# Patient Record
Sex: MALE | Race: BLACK OR AFRICAN AMERICAN | Employment: OTHER | URBAN - METROPOLITAN AREA
[De-identification: names, ages, dates, MRNs, and addresses within clinical notes are randomized per-mention and may not be internally consistent; named-entity substitution may affect disease eponyms.]

---

## 2023-11-22 ENCOUNTER — APPOINTMENT (OUTPATIENT)
Dept: GENERAL RADIOLOGY | Age: 53
End: 2023-11-22
Payer: MEDICARE

## 2023-11-22 ENCOUNTER — HOSPITAL ENCOUNTER (EMERGENCY)
Age: 53
Discharge: HOME | End: 2023-11-22
Attending: EMERGENCY MEDICINE
Payer: MEDICARE

## 2023-11-22 VITALS
OXYGEN SATURATION: 100 % | RESPIRATION RATE: 17 BRPM | BODY MASS INDEX: 25.62 KG/M2 | HEART RATE: 85 BPM | SYSTOLIC BLOOD PRESSURE: 132 MMHG | WEIGHT: 183 LBS | HEIGHT: 71 IN | DIASTOLIC BLOOD PRESSURE: 88 MMHG | TEMPERATURE: 98.6 F

## 2023-11-22 DIAGNOSIS — M25.512 ACUTE PAIN OF LEFT SHOULDER: Primary | ICD-10-CM

## 2023-11-22 PROCEDURE — 6370000000 HC RX 637 (ALT 250 FOR IP)

## 2023-11-22 PROCEDURE — 99283 EMERGENCY DEPT VISIT LOW MDM: CPT

## 2023-11-22 PROCEDURE — 73030 X-RAY EXAM OF SHOULDER: CPT

## 2023-11-22 RX ORDER — NITROGLYCERIN 0.4 MG/1
0.4 TABLET SUBLINGUAL EVERY 5 MIN PRN
COMMUNITY

## 2023-11-22 RX ORDER — GABAPENTIN 300 MG/1
300 CAPSULE ORAL 2 TIMES DAILY
COMMUNITY

## 2023-11-22 RX ORDER — PRAVASTATIN SODIUM 20 MG
20 TABLET ORAL DAILY
COMMUNITY

## 2023-11-22 RX ORDER — SEVELAMER CARBONATE 800 MG/1
1 TABLET, FILM COATED ORAL
COMMUNITY

## 2023-11-22 RX ORDER — CARVEDILOL 25 MG/1
25 TABLET ORAL 2 TIMES DAILY WITH MEALS
COMMUNITY

## 2023-11-22 RX ORDER — ALLOPURINOL 100 MG/1
100 TABLET ORAL DAILY
COMMUNITY

## 2023-11-22 RX ORDER — ZOLPIDEM TARTRATE 10 MG/1
TABLET ORAL NIGHTLY PRN
COMMUNITY

## 2023-11-22 RX ORDER — FERRIC CITRATE 210 MG/1
TABLET, COATED ORAL
COMMUNITY

## 2023-11-22 RX ORDER — PREDNISONE 50 MG/1
TABLET ORAL
Qty: 6 TABLET | Refills: 0 | Status: SHIPPED | OUTPATIENT
Start: 2023-11-22

## 2023-11-22 RX ORDER — ESCITALOPRAM OXALATE 20 MG/1
20 TABLET ORAL DAILY
COMMUNITY

## 2023-11-22 RX ORDER — KETOROLAC TROMETHAMINE 30 MG/ML
30 INJECTION, SOLUTION INTRAMUSCULAR; INTRAVENOUS
Status: DISCONTINUED | OUTPATIENT
Start: 2023-11-22 | End: 2023-11-22

## 2023-11-22 RX ORDER — LORAZEPAM 0.5 MG/1
0.5 TABLET ORAL EVERY 6 HOURS PRN
COMMUNITY

## 2023-11-22 RX ORDER — PROMETHAZINE HYDROCHLORIDE 25 MG/1
25 TABLET ORAL EVERY 6 HOURS PRN
COMMUNITY

## 2023-11-22 RX ORDER — AMLODIPINE BESYLATE 10 MG/1
10 TABLET ORAL DAILY
COMMUNITY

## 2023-11-22 RX ORDER — LIDOCAINE 4 G/G
1 PATCH TOPICAL
Status: DISCONTINUED | OUTPATIENT
Start: 2023-11-22 | End: 2023-11-22 | Stop reason: HOSPADM

## 2023-11-22 ASSESSMENT — PAIN SCALES - GENERAL
PAINLEVEL_OUTOF10: 10
PAINLEVEL_OUTOF10: 3

## 2023-11-22 ASSESSMENT — PAIN DESCRIPTION - ORIENTATION: ORIENTATION: LEFT

## 2023-11-22 ASSESSMENT — PAIN DESCRIPTION - LOCATION: LOCATION: SHOULDER

## 2023-11-22 ASSESSMENT — PAIN - FUNCTIONAL ASSESSMENT
PAIN_FUNCTIONAL_ASSESSMENT: 0-10
PAIN_FUNCTIONAL_ASSESSMENT: 0-10

## 2023-11-22 NOTE — DISCHARGE INSTRUCTIONS
Your your exam and x-rays are reassuring today. Take the prednisone steroids at home for pain and symptom relief. Do not take other NSAIDs including ibuprofen or Aleve or aspirin while you are taking the prednisone. I have also arranged for you to have outpatient follow-up with an orthopedic office. Scheduling will contact you tomorrow or later today to help you schedule an appointment. Return if you have any new or worsening symptoms.   Your prescription was sent to [facility] in [facility].

## 2023-11-22 NOTE — ED TRIAGE NOTES
Pt ambulatory to triage. Pt reports left shoulder pain for months. Pt states he thinks it is his rotator cuff. Pt states he use to be active in martial arts. Pt states he took tylenol and ibuprofen last night.

## 2023-11-22 NOTE — ED PROVIDER NOTES
Emergency Department Provider Note       PCP: No primary care provider on file. Age: 48 y.o. Sex: male     DISPOSITION Decision To Discharge 11/22/2023 02:36:16 PM       ICD-10-CM    1. Acute pain of left shoulder  M25.512 [address]          Medical Decision Making     Complexity of Problems Addressed:  Complexity of Problem: 1 acute, uncomplicated illness or injury. Data Reviewed and Analyzed:  I independently ordered and reviewed each unique test.         I interpreted the X-rays. No acute    Discussion of management or test interpretation. Presented with concerns of left shoulder pain for the last 3 days. Pain exacerbated with positional changes and movement as noted in HPI. Left shoulder x-ray imaging negative for acute fracture or dislocation. Concern for biceps tendinitis versus underlying soft tissue injury or rotator cuff involvement given reduced range of motion of left shoulder. I provided outpatient referral to orthopedics for further evaluation and management. Advised conservative symptomatic management for the time being until he has follow-up with Ortho. Risk of Complications and/or Morbidity of Patient Management:  Prescription drug management performed and Shared medical decision making was utilized in creating the patients health plan today. History      80-year-old male presenting to emergency department with complaint of left shoulder pain for the past 3 days. Reports he was lying in bed a few nights ago he felt some pain in his left shoulder and the pain is exacerbated with positional changes and movement. Also complaining of some pain in his left tricep/upper arm area. He is dialysis 3 times per week and has tenderness right above his dialysis site. Denies redness or swelling or fevers. The history is provided by the patient. Physical Exam     Vitals signs and nursing note reviewed.    Vitals:

## 2024-06-22 ENCOUNTER — APPOINTMENT (OUTPATIENT)
Dept: GENERAL RADIOLOGY | Age: 54
DRG: 640 | End: 2024-06-22
Payer: MEDICARE

## 2024-06-22 ENCOUNTER — APPOINTMENT (OUTPATIENT)
Dept: GENERAL RADIOLOGY | Age: 54
DRG: 640 | End: 2024-06-22
Attending: INTERNAL MEDICINE
Payer: MEDICARE

## 2024-06-22 ENCOUNTER — HOSPITAL ENCOUNTER (INPATIENT)
Age: 54
LOS: 3 days | Discharge: HOME | DRG: 640 | End: 2024-06-25
Attending: INTERNAL MEDICINE | Admitting: INTERNAL MEDICINE
Payer: MEDICARE

## 2024-06-22 ENCOUNTER — HOSPITAL ENCOUNTER (EMERGENCY)
Age: 54
Discharge: ANOTHER HOSPITAL | DRG: 640 | End: 2024-06-22
Attending: EMERGENCY MEDICINE
Payer: MEDICARE

## 2024-06-22 ENCOUNTER — APPOINTMENT (OUTPATIENT)
Dept: NON INVASIVE DIAGNOSTICS | Age: 54
DRG: 640 | End: 2024-06-22
Attending: INTERNAL MEDICINE
Payer: MEDICARE

## 2024-06-22 VITALS
RESPIRATION RATE: 14 BRPM | TEMPERATURE: 97.6 F | DIASTOLIC BLOOD PRESSURE: 106 MMHG | SYSTOLIC BLOOD PRESSURE: 231 MMHG | WEIGHT: 185.2 LBS | OXYGEN SATURATION: 92 % | BODY MASS INDEX: 25.93 KG/M2 | HEIGHT: 71 IN | HEART RATE: 93 BPM

## 2024-06-22 DIAGNOSIS — E87.70 HYPERVOLEMIA, UNSPECIFIED HYPERVOLEMIA TYPE: ICD-10-CM

## 2024-06-22 DIAGNOSIS — I16.1 HYPERTENSIVE EMERGENCY: Primary | ICD-10-CM

## 2024-06-22 DIAGNOSIS — Z99.2 END STAGE RENAL DISEASE ON DIALYSIS (HCC): ICD-10-CM

## 2024-06-22 DIAGNOSIS — I50.9 CONGESTIVE HEART FAILURE, UNSPECIFIED HF CHRONICITY, UNSPECIFIED HEART FAILURE TYPE (HCC): Primary | ICD-10-CM

## 2024-06-22 DIAGNOSIS — N18.6 END STAGE RENAL DISEASE ON DIALYSIS (HCC): ICD-10-CM

## 2024-06-22 PROBLEM — Z45.2 EXHAUSTED VASCULAR ACCESS: Status: ACTIVE | Noted: 2024-06-22

## 2024-06-22 LAB
ALBUMIN SERPL-MCNC: 3.5 G/DL (ref 3.5–5)
ALBUMIN/GLOB SERPL: 1.1 (ref 0.4–1.6)
ALP SERPL-CCNC: 118 U/L (ref 45–117)
ALT SERPL-CCNC: <5 U/L (ref 13–61)
ANION GAP SERPL CALC-SCNC: 25 MMOL/L (ref 2–11)
AST SERPL-CCNC: 21 U/L (ref 15–37)
BASOPHILS # BLD: 0.2 K/UL (ref 0–0.2)
BASOPHILS NFR BLD: 1 % (ref 0–2)
BILIRUB SERPL-MCNC: 0.4 MG/DL (ref 0.2–1.1)
BUN SERPL-MCNC: 23 MG/DL (ref 6–23)
CALCIUM SERPL-MCNC: 8.9 MG/DL (ref 8.3–10.4)
CHLORIDE SERPL-SCNC: 103 MMOL/L (ref 98–107)
CO2 SERPL-SCNC: 18 MMOL/L (ref 21–32)
CREAT SERPL-MCNC: 9.86 MG/DL (ref 0.8–1.5)
DIFFERENTIAL METHOD BLD: ABNORMAL
EKG ATRIAL RATE: 121 BPM
EKG DIAGNOSIS: NORMAL
EKG P AXIS: 58 DEGREES
EKG P-R INTERVAL: 152 MS
EKG Q-T INTERVAL: 368 MS
EKG QRS DURATION: 84 MS
EKG QTC CALCULATION (BAZETT): 522 MS
EKG R AXIS: 26 DEGREES
EKG T AXIS: 58 DEGREES
EKG VENTRICULAR RATE: 121 BPM
EOSINOPHIL # BLD: 1 K/UL (ref 0–0.8)
EOSINOPHIL NFR BLD: 6 % (ref 0.5–7.8)
ERYTHROCYTE [DISTWIDTH] IN BLOOD BY AUTOMATED COUNT: 16 % (ref 11.9–14.6)
GLOBULIN SER CALC-MCNC: 3.3 G/DL (ref 2.8–4.5)
GLUCOSE SERPL-MCNC: 137 MG/DL (ref 65–100)
HBV SURFACE AG SER QL: NONREACTIVE
HCT VFR BLD AUTO: 28.6 % (ref 41.1–50.3)
HGB BLD-MCNC: 8.7 G/DL (ref 13.6–17.2)
IMM GRANULOCYTES # BLD AUTO: 0 K/UL (ref 0–0.5)
IMM GRANULOCYTES NFR BLD AUTO: 0 % (ref 0–5)
LACTATE SERPL-SCNC: 3.8 MMOL/L (ref 0.5–2)
LYMPHOCYTES # BLD: 6.9 K/UL (ref 0.5–4.6)
LYMPHOCYTES NFR BLD: 43 % (ref 13–44)
MCH RBC QN AUTO: 30.6 PG (ref 26.1–32.9)
MCHC RBC AUTO-ENTMCNC: 30.4 G/DL (ref 31.4–35)
MCV RBC AUTO: 100.7 FL (ref 82–102)
MONOCYTES # BLD: 1.1 K/UL (ref 0.1–1.3)
MONOCYTES NFR BLD: 7 % (ref 4–12)
NEUTS SEG # BLD: 6.9 K/UL (ref 1.7–8.2)
NEUTS SEG NFR BLD: 43 % (ref 43–78)
NRBC # BLD: 0 K/UL (ref 0–0.2)
PLATELET # BLD AUTO: 187 K/UL (ref 150–450)
PLATELET COMMENT: ADEQUATE
PMV BLD AUTO: 11.5 FL (ref 9.4–12.3)
POTASSIUM SERPL-SCNC: 4.7 MMOL/L (ref 3.5–5.1)
PROCALCITONIN SERPL-MCNC: 0.75 NG/ML (ref 0–0.1)
PROT SERPL-MCNC: 6.8 G/DL (ref 6.4–8.2)
RBC # BLD AUTO: 2.84 M/UL (ref 4.23–5.6)
RBC MORPH BLD: ABNORMAL
SODIUM SERPL-SCNC: 146 MMOL/L (ref 133–143)
TROPONIN T SERPL HS-MCNC: 75 NG/L (ref 0–22)
TROPONIN T SERPL HS-MCNC: 76.3 NG/L (ref 0–22)
TROPONIN T SERPL HS-MCNC: 83 NG/L (ref 0–22)
WBC # BLD AUTO: 16.1 K/UL (ref 4.3–11.1)

## 2024-06-22 PROCEDURE — 2700000000 HC OXYGEN THERAPY PER DAY

## 2024-06-22 PROCEDURE — 90935 HEMODIALYSIS ONE EVALUATION: CPT

## 2024-06-22 PROCEDURE — 36556 INSERT NON-TUNNEL CV CATH: CPT | Performed by: INTERNAL MEDICINE

## 2024-06-22 PROCEDURE — 87340 HEPATITIS B SURFACE AG IA: CPT

## 2024-06-22 PROCEDURE — 6360000002 HC RX W HCPCS: Performed by: INTERNAL MEDICINE

## 2024-06-22 PROCEDURE — 99223 1ST HOSP IP/OBS HIGH 75: CPT | Performed by: INTERNAL MEDICINE

## 2024-06-22 PROCEDURE — 30233N1 TRANSFUSION OF NONAUTOLOGOUS RED BLOOD CELLS INTO PERIPHERAL VEIN, PERCUTANEOUS APPROACH: ICD-10-PCS | Performed by: INTERNAL MEDICINE

## 2024-06-22 PROCEDURE — 71045 X-RAY EXAM CHEST 1 VIEW: CPT

## 2024-06-22 PROCEDURE — 84484 ASSAY OF TROPONIN QUANT: CPT

## 2024-06-22 PROCEDURE — 6370000000 HC RX 637 (ALT 250 FOR IP): Performed by: INTERNAL MEDICINE

## 2024-06-22 PROCEDURE — 93005 ELECTROCARDIOGRAM TRACING: CPT | Performed by: EMERGENCY MEDICINE

## 2024-06-22 PROCEDURE — 80053 COMPREHEN METABOLIC PANEL: CPT

## 2024-06-22 PROCEDURE — 6370000000 HC RX 637 (ALT 250 FOR IP)

## 2024-06-22 PROCEDURE — 02HV33Z INSERTION OF INFUSION DEVICE INTO SUPERIOR VENA CAVA, PERCUTANEOUS APPROACH: ICD-10-PCS | Performed by: INTERNAL MEDICINE

## 2024-06-22 PROCEDURE — 36415 COLL VENOUS BLD VENIPUNCTURE: CPT

## 2024-06-22 PROCEDURE — 93010 ELECTROCARDIOGRAM REPORT: CPT | Performed by: INTERNAL MEDICINE

## 2024-06-22 PROCEDURE — 85025 COMPLETE CBC W/AUTO DIFF WBC: CPT

## 2024-06-22 PROCEDURE — 2500000003 HC RX 250 WO HCPCS: Performed by: INTERNAL MEDICINE

## 2024-06-22 PROCEDURE — 84145 PROCALCITONIN (PCT): CPT

## 2024-06-22 PROCEDURE — 83605 ASSAY OF LACTIC ACID: CPT

## 2024-06-22 PROCEDURE — 6360000002 HC RX W HCPCS

## 2024-06-22 PROCEDURE — 36556 INSERT NON-TUNNEL CV CATH: CPT

## 2024-06-22 PROCEDURE — 5A1D80Z PERFORMANCE OF URINARY FILTRATION, PROLONGED INTERMITTENT, 6-18 HOURS PER DAY: ICD-10-PCS | Performed by: INTERNAL MEDICINE

## 2024-06-22 PROCEDURE — 2100000000 HC CCU R&B

## 2024-06-22 PROCEDURE — 2580000003 HC RX 258: Performed by: INTERNAL MEDICINE

## 2024-06-22 PROCEDURE — 87040 BLOOD CULTURE FOR BACTERIA: CPT

## 2024-06-22 RX ORDER — SODIUM CHLORIDE 0.9 % (FLUSH) 0.9 %
5-40 SYRINGE (ML) INJECTION PRN
Status: DISCONTINUED | OUTPATIENT
Start: 2024-06-22 | End: 2024-06-25 | Stop reason: HOSPADM

## 2024-06-22 RX ORDER — ONDANSETRON 2 MG/ML
4 INJECTION INTRAMUSCULAR; INTRAVENOUS EVERY 6 HOURS PRN
Status: DISCONTINUED | OUTPATIENT
Start: 2024-06-22 | End: 2024-06-25 | Stop reason: HOSPADM

## 2024-06-22 RX ORDER — MIDAZOLAM HYDROCHLORIDE 2 MG/2ML
2 INJECTION, SOLUTION INTRAMUSCULAR; INTRAVENOUS ONCE
Status: DISCONTINUED | OUTPATIENT
Start: 2024-06-22 | End: 2024-06-25 | Stop reason: HOSPADM

## 2024-06-22 RX ORDER — NITROGLYCERIN 20 MG/100ML
INJECTION INTRAVENOUS
Status: COMPLETED
Start: 2024-06-22 | End: 2024-06-22

## 2024-06-22 RX ORDER — POLYETHYLENE GLYCOL 3350 17 G/17G
17 POWDER, FOR SOLUTION ORAL DAILY PRN
Status: DISCONTINUED | OUTPATIENT
Start: 2024-06-22 | End: 2024-06-25 | Stop reason: HOSPADM

## 2024-06-22 RX ORDER — ATORVASTATIN CALCIUM 10 MG/1
10 TABLET, FILM COATED ORAL DAILY
Status: DISCONTINUED | OUTPATIENT
Start: 2024-06-22 | End: 2024-06-25 | Stop reason: HOSPADM

## 2024-06-22 RX ORDER — SODIUM CHLORIDE 9 MG/ML
INJECTION, SOLUTION INTRAVENOUS PRN
Status: DISCONTINUED | OUTPATIENT
Start: 2024-06-22 | End: 2024-06-25 | Stop reason: HOSPADM

## 2024-06-22 RX ORDER — ONDANSETRON 4 MG/1
4 TABLET, ORALLY DISINTEGRATING ORAL EVERY 8 HOURS PRN
Status: DISCONTINUED | OUTPATIENT
Start: 2024-06-22 | End: 2024-06-25 | Stop reason: HOSPADM

## 2024-06-22 RX ORDER — ZOLPIDEM TARTRATE 5 MG/1
5 TABLET ORAL NIGHTLY PRN
Status: DISCONTINUED | OUTPATIENT
Start: 2024-06-22 | End: 2024-06-25 | Stop reason: HOSPADM

## 2024-06-22 RX ORDER — SODIUM CHLORIDE 0.9 % (FLUSH) 0.9 %
5-40 SYRINGE (ML) INJECTION EVERY 12 HOURS SCHEDULED
Status: DISCONTINUED | OUTPATIENT
Start: 2024-06-22 | End: 2024-06-25 | Stop reason: HOSPADM

## 2024-06-22 RX ORDER — AMLODIPINE BESYLATE 10 MG/1
10 TABLET ORAL DAILY
Status: DISCONTINUED | OUTPATIENT
Start: 2024-06-22 | End: 2024-06-25 | Stop reason: HOSPADM

## 2024-06-22 RX ORDER — NITROGLYCERIN 0.4 MG/1
0.4 TABLET SUBLINGUAL EVERY 5 MIN PRN
Status: DISCONTINUED | OUTPATIENT
Start: 2024-06-22 | End: 2024-06-25 | Stop reason: HOSPADM

## 2024-06-22 RX ORDER — CARVEDILOL 12.5 MG/1
25 TABLET ORAL 2 TIMES DAILY WITH MEALS
Status: DISCONTINUED | OUTPATIENT
Start: 2024-06-22 | End: 2024-06-25 | Stop reason: HOSPADM

## 2024-06-22 RX ORDER — CLONIDINE HYDROCHLORIDE 0.1 MG/1
0.1 TABLET ORAL PRN
Status: ON HOLD | COMMUNITY
Start: 2024-06-20 | End: 2024-06-25 | Stop reason: HOSPADM

## 2024-06-22 RX ORDER — OXYCODONE HYDROCHLORIDE 5 MG/1
5 TABLET ORAL EVERY 4 HOURS PRN
COMMUNITY
Start: 2024-06-18

## 2024-06-22 RX ORDER — ESCITALOPRAM OXALATE 10 MG/1
20 TABLET ORAL DAILY
Status: DISCONTINUED | OUTPATIENT
Start: 2024-06-22 | End: 2024-06-25 | Stop reason: HOSPADM

## 2024-06-22 RX ORDER — ATORVASTATIN CALCIUM 10 MG/1
10 TABLET, FILM COATED ORAL DAILY
COMMUNITY
Start: 2024-05-27

## 2024-06-22 RX ORDER — ACETAMINOPHEN 325 MG/1
650 TABLET ORAL EVERY 6 HOURS PRN
Status: DISCONTINUED | OUTPATIENT
Start: 2024-06-22 | End: 2024-06-25 | Stop reason: HOSPADM

## 2024-06-22 RX ORDER — LACTULOSE 10 G/15ML
20 SOLUTION ORAL 2 TIMES DAILY PRN
Status: DISCONTINUED | OUTPATIENT
Start: 2024-06-22 | End: 2024-06-25 | Stop reason: HOSPADM

## 2024-06-22 RX ORDER — ASPIRIN 81 MG/1
TABLET, CHEWABLE ORAL
Status: COMPLETED
Start: 2024-06-22 | End: 2024-06-22

## 2024-06-22 RX ORDER — ASPIRIN 81 MG/1
324 TABLET, CHEWABLE ORAL ONCE
Status: COMPLETED | OUTPATIENT
Start: 2024-06-22 | End: 2024-06-22

## 2024-06-22 RX ORDER — SEVELAMER CARBONATE 800 MG/1
4000 TABLET, FILM COATED ORAL DAILY
Status: DISCONTINUED | OUTPATIENT
Start: 2024-06-22 | End: 2024-06-22

## 2024-06-22 RX ORDER — ACETAMINOPHEN 650 MG/1
650 SUPPOSITORY RECTAL EVERY 6 HOURS PRN
Status: DISCONTINUED | OUTPATIENT
Start: 2024-06-22 | End: 2024-06-25 | Stop reason: HOSPADM

## 2024-06-22 RX ORDER — ALLOPURINOL 100 MG/1
100 TABLET ORAL DAILY
Status: DISCONTINUED | OUTPATIENT
Start: 2024-06-22 | End: 2024-06-25 | Stop reason: HOSPADM

## 2024-06-22 RX ORDER — DEXMEDETOMIDINE HYDROCHLORIDE 4 UG/ML
.1-1.5 INJECTION, SOLUTION INTRAVENOUS CONTINUOUS
Status: DISCONTINUED | OUTPATIENT
Start: 2024-06-22 | End: 2024-06-23 | Stop reason: ALTCHOICE

## 2024-06-22 RX ORDER — LACTULOSE 10 G/15ML
20 SOLUTION ORAL 2 TIMES DAILY PRN
COMMUNITY
Start: 2024-06-11

## 2024-06-22 RX ORDER — SEVELAMER CARBONATE 800 MG/1
4000 TABLET, FILM COATED ORAL
Status: DISCONTINUED | OUTPATIENT
Start: 2024-06-22 | End: 2024-06-25 | Stop reason: HOSPADM

## 2024-06-22 RX ORDER — OXYCODONE HYDROCHLORIDE 5 MG/1
5 TABLET ORAL EVERY 4 HOURS PRN
Status: DISCONTINUED | OUTPATIENT
Start: 2024-06-22 | End: 2024-06-25 | Stop reason: HOSPADM

## 2024-06-22 RX ORDER — MIDAZOLAM HYDROCHLORIDE 1 MG/ML
INJECTION INTRAMUSCULAR; INTRAVENOUS
Status: COMPLETED
Start: 2024-06-22 | End: 2024-06-22

## 2024-06-22 RX ORDER — NITROGLYCERIN 20 MG/100ML
5-200 INJECTION INTRAVENOUS CONTINUOUS
Status: DISCONTINUED | OUTPATIENT
Start: 2024-06-22 | End: 2024-06-22 | Stop reason: HOSPADM

## 2024-06-22 RX ORDER — CLINDAMYCIN PHOSPHATE 600 MG/50ML
600 INJECTION, SOLUTION INTRAVENOUS EVERY 8 HOURS
Status: DISCONTINUED | OUTPATIENT
Start: 2024-06-22 | End: 2024-06-22

## 2024-06-22 RX ADMIN — SODIUM CHLORIDE 5 MG/HR: 9 INJECTION, SOLUTION INTRAVENOUS at 10:27

## 2024-06-22 RX ADMIN — SODIUM CHLORIDE 1 MG: 9 INJECTION INTRAMUSCULAR; INTRAVENOUS; SUBCUTANEOUS at 13:44

## 2024-06-22 RX ADMIN — ESCITALOPRAM OXALATE 20 MG: 10 TABLET ORAL at 12:33

## 2024-06-22 RX ADMIN — SODIUM CHLORIDE 5 MG/HR: 9 INJECTION, SOLUTION INTRAVENOUS at 17:01

## 2024-06-22 RX ADMIN — NITROGLYCERIN 20 MCG/MIN: 20 INJECTION INTRAVENOUS at 09:09

## 2024-06-22 RX ADMIN — ATORVASTATIN CALCIUM 10 MG: 20 TABLET, FILM COATED ORAL at 12:32

## 2024-06-22 RX ADMIN — SEVELAMER CARBONATE 4000 MG: 800 TABLET, FILM COATED ORAL at 16:41

## 2024-06-22 RX ADMIN — CARVEDILOL 25 MG: 25 TABLET, FILM COATED ORAL at 16:38

## 2024-06-22 RX ADMIN — MIDAZOLAM 2 MG: 1 INJECTION INTRAMUSCULAR; INTRAVENOUS at 10:18

## 2024-06-22 RX ADMIN — ZOLPIDEM TARTRATE 5 MG: 5 TABLET, COATED ORAL at 22:39

## 2024-06-22 RX ADMIN — ASPIRIN 324 MG: 81 TABLET, CHEWABLE ORAL at 09:12

## 2024-06-22 RX ADMIN — CARVEDILOL 25 MG: 25 TABLET, FILM COATED ORAL at 12:33

## 2024-06-22 RX ADMIN — CEFEPIME 2000 MG: 2 INJECTION, POWDER, FOR SOLUTION INTRAVENOUS at 17:25

## 2024-06-22 RX ADMIN — DEXMEDETOMIDINE 0.2 MCG/KG/HR: 100 INJECTION, SOLUTION INTRAVENOUS at 20:19

## 2024-06-22 RX ADMIN — AMLODIPINE BESYLATE 10 MG: 10 TABLET ORAL at 12:33

## 2024-06-22 RX ADMIN — ALLOPURINOL 100 MG: 100 TABLET ORAL at 12:32

## 2024-06-22 RX ADMIN — SODIUM CHLORIDE, PRESERVATIVE FREE 10 ML: 5 INJECTION INTRAVENOUS at 10:26

## 2024-06-22 RX ADMIN — ACETAMINOPHEN 650 MG: 325 TABLET ORAL at 17:43

## 2024-06-22 RX ADMIN — SODIUM CHLORIDE, PRESERVATIVE FREE 10 ML: 5 INJECTION INTRAVENOUS at 20:20

## 2024-06-22 ASSESSMENT — ENCOUNTER SYMPTOMS
ABDOMINAL PAIN: 0
SHORTNESS OF BREATH: 1
VOMITING: 0
COUGH: 0
NAUSEA: 0

## 2024-06-22 ASSESSMENT — PAIN DESCRIPTION - LOCATION: LOCATION: HEAD

## 2024-06-22 ASSESSMENT — PAIN SCALES - GENERAL
PAINLEVEL_OUTOF10: 10
PAINLEVEL_OUTOF10: 0
PAINLEVEL_OUTOF10: 0
PAINLEVEL_OUTOF10: 3

## 2024-06-22 ASSESSMENT — PAIN - FUNCTIONAL ASSESSMENT: PAIN_FUNCTIONAL_ASSESSMENT: 0-10

## 2024-06-22 ASSESSMENT — LIFESTYLE VARIABLES
HOW OFTEN DO YOU HAVE A DRINK CONTAINING ALCOHOL: NEVER
HOW MANY STANDARD DRINKS CONTAINING ALCOHOL DO YOU HAVE ON A TYPICAL DAY: PATIENT DOES NOT DRINK

## 2024-06-22 NOTE — PROGRESS NOTES
VANCO DAILY FOLLOW UP RENAL INSUFFICIENCY PATIENT   [facility]   Pharmacy Pharmacokinetic Monitoring Service - Vancomycin    Consulting Provider: MD Shaniqua   Indication: Sepsis  Target Concentration: Pre-dialysis concentration 15-20 mg/L  Day of Therapy: 1  Additional Antimicrobials: cefepime, clindamycin    Pertinent Laboratory Values:   Wt Readings from Last 1 Encounters:   06/22/24 85.7 kg (189 lb)     Temp Readings from Last 1 Encounters:   06/22/24 97.6 °F (36.4 °C) (Oral)     Recent Labs     06/22/24  0902 06/22/24  1052   BUN 23  --    CREATININE 9.86*  --    WBC 16.1*  --    PROCAL  --  0.75*   LACTA  --  3.8*       No results found for: \"VANCOTROUGH\", \"VANCORANDOM\"    MRSA Nasal Swab: N/A. Non-respiratory infection    Assessment:  Date:  Dose/Freq Admin Times Level/Time:   6/22  HD 2000 mg x 1 (16)    6/23   Rd @ 0400                       Plan:  Concentration-guided dosing due to intermittent hemodialysis  Start vancomycin with 2000 mg x 1  Vancomycin concentrations will be ordered as clinically appropriate  Pharmacy will continue to monitor patient and adjust therapy as indicated    Thank you for the consult,  Jay Forrest, PharmD, BCOP  Clinical Pharmacist  Contact Via [employer]

## 2024-06-22 NOTE — CONSULTS
History and Physical Initial Visit NOTE           6/22/2024    Aleksey Dc                        Date of Admission:  6/22/2024    The patient's chart is reviewed and the patient is discussed with the staff.    Subjective:     Patient is a 53 y.o.  male seen and evaluated at the request of Dr. Mcgovern for central line placement.    He has a PMH of ESRD on HD, CAD, CHF, and right upper extremity necrotizing fascitis [facility].     Presented to ED on 6/22 with SOB and chest pain, [facility]. Hypoxic on room air on arrival with sat 69% and severely hypertensive. He missed HD on 6/21. He was combative and agitated in the ER and required precedex. He was admitted to the ICU with nephrology consult. CXR showed pulmonary edema and CHF. Workup on arrival significant for Cr 9.86, lactic acid 3.8, procal 0.75, WBC 16.1. Requiring 90% airvo to maintain adequate sats.     We were consulted for central line placement in setting of requiring multiple antibiotics, cardene, and precedex. Patient has poor vascular access due to right upper extremity infection and ESRD.      Review of Systems: Comprehensive ROS negative except in HPI    Current Outpatient Medications   Medication Instructions    allopurinol (ZYLOPRIM) 100 mg, Oral, DAILY    amLODIPine (NORVASC) 10 mg, DAILY    atorvastatin (LIPITOR) 10 mg, Oral, DAILY    carvedilol (COREG) 25 mg, Oral, 2 TIMES DAILY WITH MEALS    cloNIDine (CATAPRES) 0.1 mg, Oral, PRN    escitalopram (LEXAPRO) 20 mg, Oral, DAILY    lactulose (CHRONULAC) 20 g, Oral, 2 TIMES DAILY PRN    LORazepam (ATIVAN) 0.5 mg, Oral, EVERY 6 HOURS PRN    nitroGLYCERIN (NITROSTAT) 0.4 mg, EVERY 5 MIN PRN    oxyCODONE (ROXICODONE) 5 mg, Oral, EVERY 4 HOURS PRN    pravastatin (PRAVACHOL) 20 mg, DAILY    predniSONE (DELTASONE) 50 MG tablet Take 1 tablet by mouth daily for 4 days, then take one half of a  hypertensive urgency, volume overload and pulmonary edema requiring cardene drip, precedex for agitation, and antibiotics for sepsis presentation. Getting CRRT in ICU.     Principal Problem:    Volume overload/pulmonary edema    Acute respiratory failure with hypoxia    ESRD  Plan: on CRRT now for volume removal  On Airvo 90% likely due to volume overload but sats are 100%; wean as volume removal continues   Will place central line for poor access in setting of ESRD with left upper arm fistula, infection on right arm and requiring multiple antibiotics, cardene, and precedex    Sepsis  Plan: WBC, procal and lactic acid elevated; blood cultures in process, has been treated for necrotizing fascitis of right upper extremity at [facility] recently. Continue broad spectrum antibiotics.     HTN  Plan: requiring cardene drip for goal SBP <185; now improving      Full Code    Thank you very much for this referral.  We appreciate the opportunity to participate in this patient's care. No further pulmonary needs noted at this time and we have nothing further to add.  We will sign off but please call if we can be of further assistance.      CRISTHIAN Barakat - NP      In this split/shared evaluation I performed ordered medications, tests or procedures, documented information in EMR, and coordinated care. which accounted for 13 minutes clinical time.     CRISTHIAN Barakat - NP      In this split/shared evaluation I performed reviewed the patients's H&P, available images, labs, cultures., discussed case in detail with NPP, performed a medically appropriate history and exam, counseled and educated the patient and/or family member, ordered and/or reviewed medications, tests or procedures, documented information in EMR, independently interpreted images, and coordinated care. which accounted for 20 minutes clinical time.     Impression:   Patient with esrd, nec fasc, admitted with acute hypoxic respiratory failure and CXR c/w pulmonary

## 2024-06-22 NOTE — DIALYSIS
CCU DIALYSIS    Pt remains in 3304 (unit) for ordered procedure.    Consent verified for renal replacement therapy. Procedure explained to patient, opportunity for Q&A provided. Call light given.     Patient a/ox3 and vital signs stable.  /76 , P96 ,  on AirVo.    Hemodialysis initiated using LUE AVF and 15 g needles.  Machine settings per MD order.    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

## 2024-06-22 NOTE — DIALYSIS
CCU DIALYSIS    Hemodialysis treatment completed, pt ran 3.5 hours without complications.     Patient a/ox4 and /67 , P 89       4 Kg removed.      Needles x2 removed from access and manual pressure held until hemostasis complete and pressure dressing applied.      Meds given: 0.    RBCs given during dialysis: 0    Patient to remain in 3304 after dialysis.

## 2024-06-22 NOTE — H&P
Hospitalist History and Physical   Admit Date:  2024 10:00 AM   Name:  Aleksey Dc   Age:  53 y.o.  Sex:  male  :  1970   MRN:  [id number]   Room:  [address] [place] [address]    Presenting/Chief Complaint: No chief complaint on file.     Reason(s) for Admission: Volume overload [E87.70]     History of Present Illness:   Aleksey Dc is a 53 y.o. male with medical history right necrotizing fasciitis recently discharged from [facility] after treatment, end-stage renal disease on hemodialysis-patient ran on Monday and Wednesday but missed his dialysis on Friday.  He went to the dialysis center but was sent to the ER and then discharged from the ER to home.  He became noticeably short of breath and fell out while going to the bathroom this morning per the wife but he did not lose consciousness.  She called ems.  He has completed treatment for the necrotizing fasciitis with antibiotics in may at [facility].  In The ER He was hypoxic to 69%  Refused bipap or face mask in er secondary to extreme claustrophobia per the wife   In the icu here he is hypoxic to the 80's on 15L via nc.  Anxious  Using accessory muscles of respiration; tripoding; speaking in short sentences  Full code  Will attempt to obtain records from [facility]- he is not on care everywhere        Assessment & Plan:     Principal Problem:  Volume overload  Hypoxic resp failure   Admit to the icu   Place on airvo  Consult renal for hd-this will be done in ICU      Hypertensive Emergency   Admit to the intensive care unit  Continue Cardene started in the ER  Titrate for systolic blood pressure less than 185  Reinstate home meds after HD      History of CHF  Hx of cad with stents  Last known EF unknown  Will trend trops  However expect to be elevated from demand  Will also get echo  Pulmonary edema seems to be secondary to uncontrolled blood pressure and volume overload  Continue mgt via hd     Leukocytosis  May be reactive  Will check lactic acid  &  mmol/L    Anion Gap 25 (H) 2 - 11 mmol/L    Glucose 137 (H) 65 - 100 mg/dL    BUN 23 6 - 23 MG/DL    Creatinine 9.86 (H) 0.8 - 1.5 MG/DL    Est, Glom Filt Rate 6 (L) >60 ml/min/1.73m2    Calcium 8.9 8.3 - 10.4 MG/DL    Total Bilirubin 0.4 0.2 - 1.1 MG/DL    ALT <5 (L) 13.0 - 61.0 U/L    AST 21 15 - 37 U/L    Alk Phosphatase 118 (H) 45.0 - 117.0 U/L    Total Protein 6.8 6.4 - 8.2 g/dL    Albumin 3.5 3.5 - 5.0 g/dL    Globulin 3.3 2.8 - 4.5 g/dL    Albumin/Globulin Ratio 1.1 0.4 - 1.6     Troponin now then q90 min for 2 occurances    Collection Time: 06/22/24  9:02 AM   Result Value Ref Range    Troponin T 76.3 (H) 0 - 22 ng/L   EKG 12 Lead    Collection Time: 06/22/24  9:03 AM   Result Value Ref Range    Ventricular Rate 121 BPM    Atrial Rate 121 BPM    P-R Interval 152 ms    QRS Duration 84 ms    Q-T Interval 368 ms    QTc Calculation (Bazett) 522 ms    P Axis 58 degrees    R Axis 26 degrees    T Axis 58 degrees    Diagnosis       Sinus tachycardia with occasional Premature ventricular complexes  Otherwise normal ECG  No previous ECGs available  Confirmed by Angel Love MD ([id number]) on 6/22/2024 9:35:11 AM         No results for input(s): \"COVID19\" in the last 72 hours.    XR CHEST PORTABLE    Result Date: 6/22/2024  Chest X-ray INDICATION: Shortness of breath COMPARISON: 22 November 2023 TECHNIQUE: AP view of the chest was obtained.     Findings/impression: Diffuse prominence of the central pulmonary vasculature and interstitial markings with patchy airspace opacities likely reflecting edema/CHF. Mild prominence of the cardiac silhouette although this finding may be accentuated by the portable technique. Left subclavian stent again noted. Electronically signed by Titus Guardado        Signed:  Ievlisse Mcgovern MD    Part of this note may have been written by using a voice dictation software.  The note has been proof read but may still contain some grammatical/other typographical errors.

## 2024-06-22 NOTE — ED TRIAGE NOTES
Pt presents to ED with c/o shortness of breath and chest pain onset this morning. Pt was seen at [facility] ER for same yesterday. Patient in acute distress, initial 02 sat 69 percent, patient is fighting NRB mask and states he can only tolerate nasal cannula. Pushing staff away trying to take mask off. Patient placed on nasal cannula at 6L. Missed dialysis yesterday. +HTN, necrotizing fasciitis to right hand.    Per visitor, patient has had high troponins. Hx CHF, CAD, ESRD.

## 2024-06-22 NOTE — PROGRESS NOTES
4 Eyes Skin Assessment     NAME:  Aleksey Dc  YOB: 1970  MEDICAL RECORD NUMBER:  [id number]    The patient is being assessed for  Admission    I agree that at least one RN has performed a thorough Head to Toe Skin Assessment on the patient. ALL assessment sites listed below have been assessed.      Areas assessed by both nurses:    Head, Face, Ears, Shoulders, Back, Chest, Arms, Elbows, Hands, Sacrum. Buttock, Coccyx, Ischium, Legs. Feet and Heels, and Under Medical Devices         Does the Patient have a Wound? Yes wound(s) were present on assessment. LDA wound assessment was Initiated and completed by RN    Wound on RUE and R Hip from skin graft.        Hao Prevention initiated by RN: Yes  Wound Care Orders initiated by RN: Yes    Pressure Injury (Stage 3,4, Unstageable, DTI, NWPT, and Complex wounds) if present, place Wound referral order by RN under : Yes    New Ostomies, if present place, Ostomy referral order under : No     Nurse 1 eSignature: Electronically signed by Scot Holliday RN on 6/22/24 at 6:49 PM EDT    **SHARE this note so that the co-signing nurse can place an eSignature**    Nurse 2 eSignature: Electronically signed by Reinier Villanueva RN on 6/22/24 at 6:50 PM EDT

## 2024-06-22 NOTE — PROGRESS NOTES
His lactic acid and Pro-Barrett elevated at 3.8 and 75 respectively  Given his pulmonary edema we will hold off on sepsis bolus protocol  However we will have to culture him and start him on bs antibiotics  Will get non-urgent ID input.

## 2024-06-22 NOTE — PROGRESS NOTES
TRANSFER - IN REPORT:    Verbal report received from Trinh ANDRADE on Aleksey Dc  being received from [facility] for routine progression of patient care      Report consisted of patient's Situation, Background, Assessment and   Recommendations(SBAR).     Information from the following report(s) Nurse Handoff Report, Index, Adult Overview, Surgery Report, Intake/Output, MAR, and Recent Results was reviewed with the receiving nurse.    Opportunity for questions and clarification was provided.      Assessment completed upon patient's arrival to unit and care assumed.

## 2024-06-22 NOTE — CONSULTS
Infectious Disease Consult    Today's Date: 6/22/2024   Admit Date: 6/22/2024 1970    Impression:   Recent nec fasc of R hand/forearm  Leukocytosis  AHRF in the setting of volume overload and hypertensive emergency  ESRD on iHD  - Detailed hx of nec fasc as noted in HPI.  - No recent or current signs of infection involving the arm. [facility] records reviewed in detail.  - Resp failure and volume overload with hypertensive emergency. In ICU on airvo.    Plan:   No current concern for infection in the RUE. He's been previously treated with an appropriate course of abx and has followed with Surgery and Wound Care as an outpatient with good healing following a couple skin grafts.  I think his leukocytosis is likely reactive in the setting of hypertensive emergency, and I don't see any clear evidence of infection elsewhere.  Recommend holding abx at this point. Please reach out if additional concerns.    Anti-infectives:   Cefepime 6/22    Subjective:   Date of Consultation:  June 22, 2024  Referring Physician: Shaniqua    Patient is a 53 y.o. male admitted after feeling syncopal while trying to have a BM. Had missed HD recently, so has also developed hypoxemia, volume overload. Recent [facility] admission for Nec Fasc of R hand.     [facility] records reviewed. Admitted back on 5/16 for GAS infection of R hand which started on [place], [place]. He was actually hospitalized there and had multiple surgeries, but left AMA. I saw him when he initially came to [facility] and he had extensive open wounds on his R hand. Cx's only grew GAS. He was on Ceftriaxone while inpatient. He had skin grafting done  5/21. His abx EOT was 5/27, which was the day of discharge. He was set to f/u with me in ID clinic 6/3, but didn't come. He did f/u with Ortho Hand, and on 6/11 he underwent repeat debridement and further grafting. He was seen by them again 6/18, and there were no signs of infection, and plan was to f/u in 2-3 weeks.     Spoke with

## 2024-06-22 NOTE — ED NOTES
TRANSFER - OUT REPORT:    Verbal report given to Scot ANDRADE on Aleksey Dc  being transferred to [address] [place] [address] for urgent transfer  , routine progression of care    Report consisted of patient's Situation, Background, Assessment and   Recommendations(SBAR).     Information from the following report(s) Nurse Handoff Report, ED SBAR, Adult Overview, MAR, Recent Results, Alarm Parameters, and Quality Measures was reviewed with the receiving nurse.    Lines:   Peripheral IV 06/22/24 Right Antecubital (Active)   Site Assessment Clean, dry & intact 06/22/24 0901   Line Status Blood return noted;Flushed;Normal saline locked 06/22/24 0901   Dressing Status New dressing applied 06/22/24 0901        Opportunity for questions and clarification was provided.      Patient transported with:  Monitor, Pharmacy drip

## 2024-06-22 NOTE — CONSULTS
GEN GENERIC H&P/CONSULT    Subjective:     Patient is a 53 y.o male with ESRD on HD MWF missed his last 2 treatments, HTN, R. Hand wound   C/o Sob, Cp worsening for the last few days. No fever, N/V   Pt was found hypoxic, severely hypertensive in pulmonary edema   Placed on Airvo and Cardene drip   EKG : no acute ischemic changes  Troponin 76.3   Renal consult requested for urgent dialysis       Past Medical History:   Diagnosis Date    CHF (congestive heart failure) (HCC)     Chronic kidney disease     Hypertension     Necrotic hand wound (HCC)       No past surgical history on file.   Prior to Admission medications    Medication Sig Start Date End Date Taking? Authorizing Provider   escitalopram (LEXAPRO) 20 MG tablet Take 1 tablet by mouth daily    Mery Galvez MD   carvedilol (COREG) 25 MG tablet Take 1 tablet by mouth 2 times daily (with meals)    Mery Galvez MD   pravastatin (PRAVACHOL) 20 MG tablet Take 1 tablet by mouth daily    Mery Galvez MD   allopurinol (ZYLOPRIM) 100 MG tablet Take 1 tablet by mouth daily    Mery Galvez MD   promethazine (PHENERGAN) 25 MG tablet Take 1 tablet by mouth every 6 hours as needed for Nausea    Mery Galvez MD   zolpidem (AMBIEN) 10 MG tablet Take by mouth nightly as needed for Sleep.    Mery Galvez MD   gabapentin (NEURONTIN) 300 MG capsule Take 1 capsule by mouth in the morning and at bedtime.  Patient not taking: Reported on 6/22/2024    Mery Galvez MD   sevelamer (RENVELA) 800 MG tablet Take 1 tablet by mouth 3 times daily (with meals)    Mery Galvez MD   LORazepam (ATIVAN) 0.5 MG tablet Take 1 tablet by mouth every 6 hours as needed for Anxiety.    Mery Galvez MD   amLODIPine (NORVASC) 10 MG tablet Take 1 tablet by mouth daily  Patient not taking: Reported on 6/22/2024    Mery Galvez MD   ferric citrate (AURYXIA) 1  MG(Fe) TABS tablet Take by mouth 3 times daily (with  meals)    ProviderMery MD   nitroGLYCERIN (NITROSTAT) 0.4 MG SL tablet Place 1 tablet under the tongue every 5 minutes as needed for Chest pain up to max of 3 total doses. If no relief after 1 dose, call 911.    ProviderMery MD   predniSONE (DELTASONE) 50 MG tablet Take 1 tablet by mouth daily for 4 days, then take one half of a tablet daily for the remaining 4 days.  Patient not taking: Reported on 6/22/2024 11/22/23   William German PA     Allergies   Allergen Reactions    Heparin Other (See Comments)     Heparin induced cytopenia per pt      Social History     Tobacco Use    Smoking status: Never    Smokeless tobacco: Never   Substance Use Topics    Alcohol use: Not on file      No family history on file.       Review of Systems    As above     Objective:       Pulse (!) 112   Resp 27   SpO2 91%     No intake/output data recorded.  No intake/output data recorded.    PE:   In resp. Distress   Slightly sedated   Lung: diffuse rhonchi  CV: Rr, JVD +, no rub  Abd: soft, not tender  Ext: no edema  AVF in L. Arm: aneurysms, presence of bruit, increase pulsation      Data Review:     Recent Results (from the past 24 hour(s))   CBC with Auto Differential    Collection Time: 06/22/24  9:02 AM   Result Value Ref Range    WBC 16.1 (H) 4.3 - 11.1 K/uL    RBC 2.84 (L) 4.23 - 5.60 M/uL    Hemoglobin 8.7 (L) 13.6 - 17.2 g/dL    Hematocrit 28.6 (L) 41.1 - 50.3 %    .7 82.0 - 102.0 FL    MCH 30.6 26.1 - 32.9 PG    MCHC 30.4 (L) 31.4 - 35.0 g/dL    RDW 16.0 (H) 11.9 - 14.6 %    Platelets 187 150 - 450 K/uL    MPV 11.5 9.4 - 12.3 FL    nRBC 0.00 0.0 - 0.2 K/uL    Neutrophils % 43 43 - 78 %    Lymphocytes % 43 13 - 44 %    Monocytes % 7 4.0 - 12.0 %    Eosinophils % 6 0.5 - 7.8 %    Basophils % 1 0.0 - 2.0 %    Immature Granulocytes % 0 0.0 - 5.0 %    Neutrophils Absolute 6.9 1.7 - 8.2 K/UL    Lymphocytes Absolute 6.9 (H) 0.5 - 4.6 K/UL    Monocytes Absolute 1.1 0.1 - 1.3 K/UL    Eosinophils Absolute 1.0

## 2024-06-22 NOTE — ED PROVIDER NOTES
Emergency Department Provider Note       PCP: No primary care provider on file.   Age: 53 y.o.   Sex: male     DISPOSITION Decision To Transfer 06/22/2024 09:21:30 AM       ICD-10-CM    1. Hypertensive emergency  I16.1       2. Hypervolemia, unspecified hypervolemia type  E87.70       3. End stage renal disease on dialysis (HCC)  N18.6     Z99.2           Medical Decision Making     Hypertensive emergency and fluid overload.  Patient be transported emergently downtown for emergent dialysis.  Nitroglycerin drip started in ED for hypertensive emergency and fluid overload.  Patient does not make urine so Lasix is not provided.  EKG does not show STEMI     1 or more acute illnesses that pose a threat to life or bodily function.   Drug therapy given requiring intensive monitoring for toxicity.  Shared medical decision making was utilized in creating the patients health plan today.    I independently ordered and reviewed each unique test.     The patients assessment required an independent historian: wife.  The reason they were needed is important historical information not provided by the patient.acuity of conditoion  I interpreted the X-rays enlarged heart and fluid overload/diffuse pulmonary edema.  EKG interpretation in absence of cardiology  EKG shows sinus tachycardia rate of 121, normal axis, no hypertrophy, no acute ST-T changes [place] nonspecific lateral and inferior ST-T changes no STEMI  The patient was admitted and I have discussed patient management with the admitting provider.  Discussed with nephrology as well    Exclusion criteria - the patient is NOT to be included for SEP-1 Core Measure due to: Alternative explanation for abnormal labs/vitals that do not relate to sepsis, see MDM for further explanation   Critical care procedure note : 30 minutes of critical care time was performed in the emergency department. This was separate from any other procedures listed during the patients emergency department    Findings/impression: Diffuse prominence of the central pulmonary vasculature and   interstitial markings with patchy airspace opacities likely reflecting   edema/CHF. Mild prominence of the cardiac silhouette although this finding may   be accentuated by the portable technique. Left subclavian stent again noted.         Electronically signed by Titus Guardado                   No results for input(s): \"COVID19\" in the last 72 hours.    Voice dictation software was used during the making of this note.  This software is not perfect and grammatical and other typographical errors may be present.  This note has not been completely proofread for errors.     Kev Hanley MD  06/22/24 4503

## 2024-06-22 NOTE — OP NOTE
Procedure:  Central Line Insertion  CPT - 56104    Indication:  Exhausted vascular access    Chlorohexidine Skin Antiseptic: Yes  Hand Hygiene: Yes  Maximal Barrier Precautions: Yes  Optimal Catheter Site Selection: Yes  Sterile Ultrasound Technique: Yes    Location:  Attempt was first made on the right but with very small target, able to enter vessel and get blood return but unable to thread wire. Changed to left side. See below.     left  internal jugular    Time out done and correct patient/location for procedure.    Area prepped and sterilized with chlorhexedine. Sterile drapes applied.  Patient given local lidocane for anesthesia. Using Seldinger technique triple lumen lumen catheter inserted. Guidewire removed. All ports with blood return and lines flushed. Line sutured in place. Patient tolerated procedure well, no complications.   Estimated Blood loss: <40cc    Jann Parada MD

## 2024-06-23 ENCOUNTER — APPOINTMENT (OUTPATIENT)
Dept: NON INVASIVE DIAGNOSTICS | Age: 54
DRG: 640 | End: 2024-06-23
Attending: INTERNAL MEDICINE
Payer: MEDICARE

## 2024-06-23 PROBLEM — Z95.820 S/P ANGIOPLASTY WITH STENT: Status: ACTIVE | Noted: 2024-06-23

## 2024-06-23 PROBLEM — I16.1 HYPERTENSIVE EMERGENCY: Status: ACTIVE | Noted: 2024-06-23

## 2024-06-23 PROBLEM — I25.10 CAD (CORONARY ARTERY DISEASE): Status: ACTIVE | Noted: 2024-06-23

## 2024-06-23 PROBLEM — J96.01 ACUTE RESPIRATORY FAILURE WITH HYPOXIA (HCC): Status: ACTIVE | Noted: 2024-06-23

## 2024-06-23 PROBLEM — D64.9 ACUTE ON CHRONIC ANEMIA: Status: ACTIVE | Noted: 2024-06-23

## 2024-06-23 PROBLEM — F41.9 ANXIETY: Status: ACTIVE | Noted: 2024-06-23

## 2024-06-23 LAB
ALBUMIN SERPL-MCNC: 2.2 G/DL (ref 3.5–5)
ALBUMIN/GLOB SERPL: 0.7 (ref 1–1.9)
ALP SERPL-CCNC: 74 U/L (ref 40–129)
ALT SERPL-CCNC: <5 U/L (ref 12–65)
ANION GAP SERPL CALC-SCNC: 8 MMOL/L (ref 9–18)
AST SERPL-CCNC: 15 U/L (ref 15–37)
BASOPHILS # BLD: 0 K/UL (ref 0–0.2)
BASOPHILS NFR BLD: 0 % (ref 0–2)
BILIRUB SERPL-MCNC: 0.4 MG/DL (ref 0–1.2)
BUN SERPL-MCNC: 16 MG/DL (ref 6–23)
CALCIUM SERPL-MCNC: 7.9 MG/DL (ref 8.8–10.2)
CHLORIDE SERPL-SCNC: 101 MMOL/L (ref 98–107)
CO2 SERPL-SCNC: 28 MMOL/L (ref 20–28)
CREAT SERPL-MCNC: 6.45 MG/DL (ref 0.8–1.3)
DIFFERENTIAL METHOD BLD: ABNORMAL
ECHO AO ASC DIAM: 3.1 CM
ECHO AO ASCENDING AORTA INDEX: 1.52 CM/M2
ECHO AO ROOT DIAM: 3.1 CM
ECHO AO ROOT INDEX: 1.52 CM/M2
ECHO AV AREA PEAK VELOCITY: 2.9 CM2
ECHO AV AREA VTI: 3 CM2
ECHO AV AREA/BSA PEAK VELOCITY: 1.4 CM2/M2
ECHO AV AREA/BSA VTI: 1.5 CM2/M2
ECHO AV MEAN GRADIENT: 6 MMHG
ECHO AV MEAN VELOCITY: 1.2 M/S
ECHO AV PEAK GRADIENT: 12 MMHG
ECHO AV PEAK VELOCITY: 1.8 M/S
ECHO AV VELOCITY RATIO: 0.67
ECHO AV VTI: 33.5 CM
ECHO BSA: 2.06 M2
ECHO IVC PROX: 2.6 CM
ECHO LA AREA 2C: 26.9 CM2
ECHO LA AREA 4C: 23.4 CM2
ECHO LA DIAMETER INDEX: 1.91 CM/M2
ECHO LA DIAMETER: 3.9 CM
ECHO LA MAJOR AXIS: 6.3 CM
ECHO LA MINOR AXIS: 6.8 CM
ECHO LA TO AORTIC ROOT RATIO: 1.26
ECHO LA VOL BP: 80 ML (ref 18–58)
ECHO LA VOL MOD A2C: 84 ML (ref 18–58)
ECHO LA VOL MOD A4C: 70 ML (ref 18–58)
ECHO LA VOL/BSA BIPLANE: 39 ML/M2 (ref 16–34)
ECHO LA VOLUME INDEX MOD A2C: 41 ML/M2 (ref 16–34)
ECHO LA VOLUME INDEX MOD A4C: 34 ML/M2 (ref 16–34)
ECHO LV E' LATERAL VELOCITY: 8 CM/S
ECHO LV E' SEPTAL VELOCITY: 6 CM/S
ECHO LV EDV A2C: 144 ML
ECHO LV EDV A4C: 150 ML
ECHO LV EDV INDEX A4C: 74 ML/M2
ECHO LV EDV NDEX A2C: 71 ML/M2
ECHO LV EJECTION FRACTION A2C: 56 %
ECHO LV EJECTION FRACTION A4C: 56 %
ECHO LV EJECTION FRACTION BIPLANE: 54 % (ref 55–100)
ECHO LV ESV A2C: 63 ML
ECHO LV ESV A4C: 66 ML
ECHO LV ESV INDEX A2C: 31 ML/M2
ECHO LV ESV INDEX A4C: 32 ML/M2
ECHO LV FRACTIONAL SHORTENING: 30 % (ref 28–44)
ECHO LV INTERNAL DIMENSION DIASTOLE INDEX: 2.6 CM/M2
ECHO LV INTERNAL DIMENSION DIASTOLIC: 5.3 CM (ref 4.2–5.9)
ECHO LV INTERNAL DIMENSION SYSTOLIC INDEX: 1.81 CM/M2
ECHO LV INTERNAL DIMENSION SYSTOLIC: 3.7 CM
ECHO LV IVSD: 1.2 CM (ref 0.6–1)
ECHO LV MASS 2D: 256.6 G (ref 88–224)
ECHO LV MASS INDEX 2D: 125.8 G/M2 (ref 49–115)
ECHO LV POSTERIOR WALL DIASTOLIC: 1.2 CM (ref 0.6–1)
ECHO LV RELATIVE WALL THICKNESS RATIO: 0.45
ECHO LVOT AREA: 4.2 CM2
ECHO LVOT AV VTI INDEX: 0.71
ECHO LVOT DIAM: 2.3 CM
ECHO LVOT MEAN GRADIENT: 4 MMHG
ECHO LVOT PEAK GRADIENT: 6 MMHG
ECHO LVOT PEAK VELOCITY: 1.2 M/S
ECHO LVOT STROKE VOLUME INDEX: 48.7 ML/M2
ECHO LVOT SV: 99.2 ML
ECHO LVOT VTI: 23.9 CM
ECHO MV A VELOCITY: 1.16 M/S
ECHO MV AREA VTI: 2.4 CM2
ECHO MV E DECELERATION TIME (DT): 207 MS
ECHO MV E VELOCITY: 1.19 M/S
ECHO MV E/A RATIO: 1.03
ECHO MV E/E' LATERAL: 14.88
ECHO MV E/E' RATIO (AVERAGED): 17.35
ECHO MV E/E' SEPTAL: 19.83
ECHO MV LVOT VTI INDEX: 1.72
ECHO MV MAX VELOCITY: 1.4 M/S
ECHO MV MEAN GRADIENT: 3 MMHG
ECHO MV MEAN VELOCITY: 0.8 M/S
ECHO MV PEAK GRADIENT: 8 MMHG
ECHO MV VTI: 41 CM
ECHO PV ACCELERATION TIME (AT): 140 MS
ECHO PV MAX VELOCITY: 1.1 M/S
ECHO PV PEAK GRADIENT: 5 MMHG
ECHO RV BASAL DIMENSION: 3.4 CM
ECHO RV FREE WALL PEAK S': 14 CM/S
ECHO RV INTERNAL DIMENSION: 3.5 CM
ECHO RV TAPSE: 2.2 CM (ref 1.7–?)
EOSINOPHIL # BLD: 0.5 K/UL (ref 0–0.8)
EOSINOPHIL NFR BLD: 6 % (ref 0.5–7.8)
ERYTHROCYTE [DISTWIDTH] IN BLOOD BY AUTOMATED COUNT: 15.6 % (ref 11.9–14.6)
FERRITIN SERPL-MCNC: 1909 NG/ML (ref 8–388)
FOLATE SERPL-MCNC: 3.5 NG/ML (ref 3.1–17.5)
GLOBULIN SER CALC-MCNC: 3.2 G/DL (ref 2.3–3.5)
GLUCOSE SERPL-MCNC: 111 MG/DL (ref 70–99)
HCT VFR BLD AUTO: 21 % (ref 41.1–50.3)
HCT VFR BLD AUTO: 21.2 % (ref 41.1–50.3)
HCT VFR BLD AUTO: 23.5 % (ref 41.1–50.3)
HGB BLD-MCNC: 6.5 G/DL (ref 13.6–17.2)
HGB BLD-MCNC: 6.5 G/DL (ref 13.6–17.2)
HGB BLD-MCNC: 7.5 G/DL (ref 13.6–17.2)
HISTORY CHECK: NORMAL
IMM GRANULOCYTES # BLD AUTO: 0 K/UL (ref 0–0.5)
IMM GRANULOCYTES NFR BLD AUTO: 0 % (ref 0–5)
IRON SATN MFR SERPL: 27 % (ref 20–50)
IRON SERPL-MCNC: 36 UG/DL (ref 35–100)
LYMPHOCYTES # BLD: 1.2 K/UL (ref 0.5–4.6)
LYMPHOCYTES NFR BLD: 16 % (ref 13–44)
MCH RBC QN AUTO: 30 PG (ref 26.1–32.9)
MCHC RBC AUTO-ENTMCNC: 30.7 G/DL (ref 31.4–35)
MCV RBC AUTO: 97.7 FL (ref 82–102)
MONOCYTES # BLD: 0.7 K/UL (ref 0.1–1.3)
MONOCYTES NFR BLD: 10 % (ref 4–12)
NEUTS SEG # BLD: 4.8 K/UL (ref 1.7–8.2)
NEUTS SEG NFR BLD: 67 % (ref 43–78)
NRBC # BLD: 0 K/UL (ref 0–0.2)
PLATELET # BLD AUTO: 115 K/UL (ref 150–450)
PMV BLD AUTO: 10.9 FL (ref 9.4–12.3)
POTASSIUM SERPL-SCNC: 4 MMOL/L (ref 3.5–5.1)
PROT SERPL-MCNC: 5.4 G/DL (ref 6.3–8.2)
RBC # BLD AUTO: 2.17 M/UL (ref 4.23–5.6)
SODIUM SERPL-SCNC: 137 MMOL/L (ref 136–145)
TIBC SERPL-MCNC: 131 UG/DL (ref 240–450)
UIBC SERPL-MCNC: 95.3 UG/DL (ref 112–347)
VIT B12 SERPL-MCNC: 530 PG/ML (ref 193–986)
WBC # BLD AUTO: 7.2 K/UL (ref 4.3–11.1)

## 2024-06-23 PROCEDURE — 85025 COMPLETE CBC W/AUTO DIFF WBC: CPT

## 2024-06-23 PROCEDURE — 6370000000 HC RX 637 (ALT 250 FOR IP): Performed by: INTERNAL MEDICINE

## 2024-06-23 PROCEDURE — 93306 TTE W/DOPPLER COMPLETE: CPT

## 2024-06-23 PROCEDURE — 83550 IRON BINDING TEST: CPT

## 2024-06-23 PROCEDURE — 80053 COMPREHEN METABOLIC PANEL: CPT

## 2024-06-23 PROCEDURE — 85018 HEMOGLOBIN: CPT

## 2024-06-23 PROCEDURE — 82746 ASSAY OF FOLIC ACID SERUM: CPT

## 2024-06-23 PROCEDURE — 86901 BLOOD TYPING SEROLOGIC RH(D): CPT

## 2024-06-23 PROCEDURE — 2500000003 HC RX 250 WO HCPCS: Performed by: INTERNAL MEDICINE

## 2024-06-23 PROCEDURE — 85014 HEMATOCRIT: CPT

## 2024-06-23 PROCEDURE — 86850 RBC ANTIBODY SCREEN: CPT

## 2024-06-23 PROCEDURE — P9016 RBC LEUKOCYTES REDUCED: HCPCS

## 2024-06-23 PROCEDURE — 6360000002 HC RX W HCPCS: Performed by: INTERNAL MEDICINE

## 2024-06-23 PROCEDURE — 82728 ASSAY OF FERRITIN: CPT

## 2024-06-23 PROCEDURE — 36430 TRANSFUSION BLD/BLD COMPNT: CPT

## 2024-06-23 PROCEDURE — 36592 COLLECT BLOOD FROM PICC: CPT

## 2024-06-23 PROCEDURE — 93306 TTE W/DOPPLER COMPLETE: CPT | Performed by: INTERNAL MEDICINE

## 2024-06-23 PROCEDURE — 2580000003 HC RX 258: Performed by: INTERNAL MEDICINE

## 2024-06-23 PROCEDURE — 86923 COMPATIBILITY TEST ELECTRIC: CPT

## 2024-06-23 PROCEDURE — 2700000000 HC OXYGEN THERAPY PER DAY

## 2024-06-23 PROCEDURE — 82607 VITAMIN B-12: CPT

## 2024-06-23 PROCEDURE — 83540 ASSAY OF IRON: CPT

## 2024-06-23 PROCEDURE — 1100000000 HC RM PRIVATE

## 2024-06-23 PROCEDURE — 86900 BLOOD TYPING SEROLOGIC ABO: CPT

## 2024-06-23 RX ORDER — SODIUM CHLORIDE 9 MG/ML
INJECTION, SOLUTION INTRAVENOUS PRN
Status: DISCONTINUED | OUTPATIENT
Start: 2024-06-23 | End: 2024-06-25 | Stop reason: HOSPADM

## 2024-06-23 RX ADMIN — CARVEDILOL 25 MG: 25 TABLET, FILM COATED ORAL at 08:32

## 2024-06-23 RX ADMIN — SEVELAMER CARBONATE 4000 MG: 800 TABLET, FILM COATED ORAL at 18:09

## 2024-06-23 RX ADMIN — ZOLPIDEM TARTRATE 5 MG: 5 TABLET, COATED ORAL at 23:35

## 2024-06-23 RX ADMIN — OXYCODONE 5 MG: 5 TABLET ORAL at 18:08

## 2024-06-23 RX ADMIN — OXYCODONE 5 MG: 5 TABLET ORAL at 14:31

## 2024-06-23 RX ADMIN — SODIUM CHLORIDE, PRESERVATIVE FREE 10 ML: 5 INJECTION INTRAVENOUS at 08:31

## 2024-06-23 RX ADMIN — SODIUM CHLORIDE 1 MG: 9 INJECTION INTRAMUSCULAR; INTRAVENOUS; SUBCUTANEOUS at 20:14

## 2024-06-23 RX ADMIN — SEVELAMER CARBONATE 4000 MG: 800 TABLET, FILM COATED ORAL at 11:47

## 2024-06-23 RX ADMIN — ALLOPURINOL 100 MG: 100 TABLET ORAL at 08:32

## 2024-06-23 RX ADMIN — ESCITALOPRAM OXALATE 20 MG: 10 TABLET ORAL at 08:32

## 2024-06-23 RX ADMIN — SODIUM CHLORIDE, PRESERVATIVE FREE 10 ML: 5 INJECTION INTRAVENOUS at 20:21

## 2024-06-23 RX ADMIN — AMLODIPINE BESYLATE 10 MG: 10 TABLET ORAL at 08:32

## 2024-06-23 RX ADMIN — OXYCODONE 5 MG: 5 TABLET ORAL at 23:50

## 2024-06-23 RX ADMIN — ATORVASTATIN CALCIUM 10 MG: 20 TABLET, FILM COATED ORAL at 08:32

## 2024-06-23 RX ADMIN — CARVEDILOL 25 MG: 25 TABLET, FILM COATED ORAL at 17:06

## 2024-06-23 RX ADMIN — SODIUM CHLORIDE 1 MG: 9 INJECTION INTRAMUSCULAR; INTRAVENOUS; SUBCUTANEOUS at 15:11

## 2024-06-23 RX ADMIN — DEXMEDETOMIDINE 0.6 MCG/KG/HR: 100 INJECTION, SOLUTION INTRAVENOUS at 05:51

## 2024-06-23 RX ADMIN — SEVELAMER CARBONATE 4000 MG: 800 TABLET, FILM COATED ORAL at 08:33

## 2024-06-23 RX ADMIN — SODIUM CHLORIDE 1 MG: 9 INJECTION INTRAMUSCULAR; INTRAVENOUS; SUBCUTANEOUS at 08:05

## 2024-06-23 ASSESSMENT — PAIN DESCRIPTION - DESCRIPTORS
DESCRIPTORS: STABBING;NUMBNESS
DESCRIPTORS: BURNING;STABBING
DESCRIPTORS: ACHING;BURNING
DESCRIPTORS: BURNING;SORE

## 2024-06-23 ASSESSMENT — PAIN SCALES - GENERAL
PAINLEVEL_OUTOF10: 0
PAINLEVEL_OUTOF10: 6
PAINLEVEL_OUTOF10: 7
PAINLEVEL_OUTOF10: 0
PAINLEVEL_OUTOF10: 8
PAINLEVEL_OUTOF10: 8
PAINLEVEL_OUTOF10: 2

## 2024-06-23 ASSESSMENT — PAIN DESCRIPTION - ORIENTATION
ORIENTATION: RIGHT

## 2024-06-23 ASSESSMENT — PAIN - FUNCTIONAL ASSESSMENT: PAIN_FUNCTIONAL_ASSESSMENT: 0-10

## 2024-06-23 ASSESSMENT — PAIN SCALES - WONG BAKER
WONGBAKER_NUMERICALRESPONSE: HURTS EVEN MORE
WONGBAKER_NUMERICALRESPONSE: HURTS A LITTLE BIT

## 2024-06-23 ASSESSMENT — PAIN DESCRIPTION - LOCATION
LOCATION: LEG
LOCATION: ARM
LOCATION: ARM

## 2024-06-23 NOTE — PROGRESS NOTES
Pt admitted from CCU he is alert and oriented with notable anxiety. Pts wife is at bedside. Lung sounds are diminished no distress noted he is on room air tolerating well. Abd soft bowel sounds are active pt has wounds noted to rt forearm and rt thigh with dressing clean dry and intact. Fistula to left arm open to air no dressing present +thrill and + bruit. Pt had a c/o pain 7/10 he was given oxycodone 5mg per MD order. Safety measures in place will continue to monitor.

## 2024-06-23 NOTE — PROGRESS NOTES
TRANSFER - IN REPORT:    Verbal report received from Carola ANDRADE  on Aleksey Dc  being received from CCU for routine progression of patient care      Report consisted of patient's Situation, Background, Assessment and   Recommendations(SBAR).     Information from the following report(s) Nurse Handoff Report was reviewed with the receiving nurse.    Opportunity for questions and clarification was provided.      Assessment completed upon patient's arrival to unit and care assumed.

## 2024-06-23 NOTE — PROGRESS NOTES
Dr Tran contacted by secure message regarding pt Hgb 6.5 down from 8.7 yesterday. Dr would like to have daytime rounding physician to determine course of action and likely do transfusion during pt dialysis treatment today.

## 2024-06-23 NOTE — PROGRESS NOTES
TRANSFER - OUT REPORT:    Verbal report given to RAYMOND Garcia on Aleksey Dc  being transferred to [facility] for routine progression of patient care       Report consisted of patient's Situation, Background, Assessment and   Recommendations(SBAR).     Information from the following report(s) Nurse Handoff Report, Intake/Output, Recent Results, Med Rec Status, Neuro Assessment, and Event Log was reviewed with the receiving nurse.           Lines:   Peripheral IV 06/22/24 Right Antecubital (Active)   Site Assessment Clean, dry & intact 06/23/24 1059   Line Status Normal saline locked 06/23/24 1059   Line Care Connections checked and tightened;Ports disinfected 06/23/24 1059   Phlebitis Assessment No symptoms 06/23/24 1059   Infiltration Assessment 0 06/23/24 1059   Alcohol Cap Used Yes 06/23/24 1059   Dressing Status Clean, dry & intact 06/23/24 1059   Dressing Type Transparent 06/23/24 1059        Opportunity for questions and clarification was provided.      Patient transported with:  Monitor, O2 @ 4lpm, and Registered Nurse

## 2024-06-23 NOTE — PLAN OF CARE
Problem: Anxiety  Goal: Will report anxiety at manageable levels  Description: INTERVENTIONS:  1. Administer medication as ordered  2. Teach and rehearse alternative coping skills  3. Provide emotional support with 1:1 interaction with staff  Outcome: Not Progressing  Flowsheets (Taken 6/22/2024 2130)  Will report anxiety at manageable levels:   Administer medication as ordered   Provide emotional support with 1:1 interaction with staff   Teach and rehearse alternative coping skills  Note: Pt sts he has severe anxiety.     Problem: Respiratory - Adult  Goal: Achieves optimal ventilation and oxygenation  Outcome: Progressing  Flowsheets (Taken 6/22/2024 2130)  Achieves optimal ventilation and oxygenation:   Assess for changes in respiratory status   Assess for changes in mentation and behavior   Position to facilitate oxygenation and minimize respiratory effort   Oxygen supplementation based on oxygen saturation or arterial blood gases     Problem: Metabolic/Fluid and Electrolytes - Adult  Goal: Hemodynamic stability and optimal renal function maintained  Outcome: Progressing  Flowsheets (Taken 6/22/2024 2130)  Hemodynamic stability and optimal renal function maintained:   Monitor labs and assess for signs and symptoms of volume excess or deficit   Monitor intake, output and patient weight   Monitor response to interventions for patient's volume status, including labs, urine output, blood pressure (other measures as available)     Problem: Anxiety  Goal: Will report anxiety at manageable levels  Description: INTERVENTIONS:  1. Administer medication as ordered  2. Teach and rehearse alternative coping skills  3. Provide emotional support with 1:1 interaction with staff  Outcome: Not Progressing  Flowsheets (Taken 6/22/2024 2130)  Will report anxiety at manageable levels:   Administer medication as ordered   Provide emotional support with 1:1 interaction with staff   Teach and rehearse alternative coping

## 2024-06-23 NOTE — PROGRESS NOTES
Admit Date: 6/22/2024      Subjective:      Patient is a 53 y.o male with ESRD on HD MWF missed his last treatment, HTN, R. Hand wound   C/o Sob, Cp worsening for the last few days. No fever, N/V   Pt was found hypoxic, severely hypertensive in pulmonary edema   Placed on Airvo and Cardene drip   EKG : no acute ischemic changes  Troponin 76.3   Renal consult requested for urgent dialysis    Review of Systems  Cardio-vascular: no chest pain, no SOB  GI: no N/V/D  : no dysuria, no hematuria    Objective:     Patient Vitals for the past 8 hrs:   BP Temp Temp src Pulse Resp SpO2   06/23/24 0742 -- -- -- 70 15 100 %   06/23/24 0500 (!) 149/67 -- -- 72 (!) 40 100 %   06/23/24 0439 -- -- -- 71 24 100 %   06/23/24 0425 124/82 98.5 °F (36.9 °C) Axillary 71 (!) 9 100 %   06/23/24 0300 -- -- -- 74 10 100 %   06/23/24 0200 -- -- -- 71 (!) 34 100 %     No intake/output data recorded.      Physical Exam:   Alert   Lungs: few rhonchi   CV: Rr, JVD +, no rub  Abd: soft, not tender  Ext: no edema  AVF in L. Arm: aneurysms, presence of bruit, increase pulsation            Data Review   Recent Results (from the past 8 hour(s))   Comprehensive Metabolic Panel w/ Reflex to MG    Collection Time: 06/23/24  4:51 AM   Result Value Ref Range    Sodium 137 136 - 145 mmol/L    Potassium 4.0 3.5 - 5.1 mmol/L    Chloride 101 98 - 107 mmol/L    CO2 28 20 - 28 mmol/L    Anion Gap 8 (L) 9 - 18 mmol/L    Glucose 111 (H) 70 - 99 mg/dL    BUN 16 6 - 23 MG/DL    Creatinine 6.45 (H) 0.80 - 1.30 MG/DL    Est, Glom Filt Rate 10 (L) >60 ml/min/1.73m2    Calcium 7.9 (L) 8.8 - 10.2 MG/DL    Total Bilirubin 0.4 0.0 - 1.2 MG/DL    ALT <5 (L) 12 - 65 U/L    AST 15 15 - 37 U/L    Alk Phosphatase 74 40 - 129 U/L    Total Protein 5.4 (L) 6.3 - 8.2 g/dL    Albumin 2.2 (L) 3.5 - 5.0 g/dL    Globulin 3.2 2.3 - 3.5 g/dL    Albumin/Globulin Ratio 0.7 (L) 1.0 - 1.9     CBC with Auto Differential    Collection Time: 06/23/24  4:51 AM   Result Value Ref Range    WBC

## 2024-06-23 NOTE — PROGRESS NOTES
Hospitalist Progress Note   Admit Date:  2024 10:00 AM   Name:  Aleksey Dc   Age:  53 y.o.  Sex:  male  :  1970   MRN:  [id number]   Room:  [address] [place] [address]    Presenting/Chief Complaint: No chief complaint on file.     Reason(s) for Admission: Volume overload [E87.70]     Hospital Course:   NOTICE FOR THE PATIENT: This clinical note is not designed to be interpreted by patients and we do not recommend reading it unless you have medical training. These notes may contain candid and (unintentionally) offensive descriptions, which are sometimes required for accurate documentation. If you would like more information about your healthcare, please obtain it directly by myself or my staff/colleagues - never solely from the notes. Thank you for your understanding and cooperation.     Please refer to the admission H&P for details of presentation.      In summary, Aleksey Dc is a 53 y.o. male with medical history significant for recent right upper extremity necrotizing fasciitis treated at [facility], end-stage renal disease on hemodialysis, hypertension, CAD status post stents, CHF, severe claustrophobia/anxiety who presented to emergency room with shortness of breath.  Patient had missed dialysis on Friday as he he was sent from his dialysis center ER but then was discharged from ER without dialysis.  In the emergency room, he was hypoxic to 69% on room air.  Refused BiPAP or facemask due to severe anxiety/claustrophobia.  He was noted to be using accessory muscle respiration and tripoding.  Patient was placed on Airvo and was admitted to ICU on Precedex gtt and Cardene gtt. for his hypertensive emergency     He underwent dialysis on 2024 with improvement in his respiration.    Subjective/24 hr Events (24) :  Patient is seen and examined at bedside.   Patient laying in bed.  Still requiring pressors and gtt.  Reports having anxiety but better controlled.  Breathing is improved.  Currently on 6 L O2  polyethylene glycol (GLYCOLAX) packet 17 g  17 g Oral Daily PRN    acetaminophen (TYLENOL) tablet 650 mg  650 mg Oral Q6H PRN    Or    acetaminophen (TYLENOL) suppository 650 mg  650 mg Rectal Q6H PRN    LORazepam (ATIVAN) 1 mg in sodium chloride (PF) 0.9 % 10 mL injection  1 mg IntraVENous Q2H PRN    dexmedeTOMIDine (PRECEDEX) 400 mcg in sodium chloride 0.9 % 100 mL infusion  0.1-1.5 mcg/kg/hr IntraVENous Continuous    carvedilol (COREG) tablet 25 mg  25 mg Oral BID     escitalopram (LEXAPRO) tablet 20 mg  20 mg Oral Daily    nitroGLYCERIN (NITROSTAT) SL tablet 0.4 mg  0.4 mg SubLINGual Q5 Min PRN    zolpidem (AMBIEN) tablet 5 mg  5 mg Oral Nightly PRN    oxyCODONE (ROXICODONE) immediate release tablet 5 mg  5 mg Oral Q4H PRN    lactulose (CHRONULAC) 10 GM/15ML solution 20 g  20 g Oral BID PRN    atorvastatin (LIPITOR) tablet 10 mg  10 mg Oral Daily    amLODIPine (NORVASC) tablet 10 mg  10 mg Oral Daily    allopurinol (ZYLOPRIM) tablet 100 mg  100 mg Oral Daily    sevelamer (RENVELA) tablet 4,000 mg  4,000 mg Oral TID        Signed:  Chuckie Wu MD    Part of this note may have been written by using a voice dictation software.  The note has been proof read but may still contain some grammatical/other typographical errors.

## 2024-06-23 NOTE — CONSENT
Informed Consent for Blood Component Transfusion Note    I have discussed with the patient the rationale for blood component transfusion; its benefits in treating or preventing fatigue, organ damage, or death; and its risk which includes mild transfusion reactions, rare risk of blood borne infection, or more serious but rare reactions. I have discussed the alternatives to transfusion, including the risk and consequences of not receiving transfusion. The patient had an opportunity to ask questions and had agreed to proceed with transfusion of blood components.    Electronically signed by Chuckie Wu MD on 6/23/24 at 8:57 AM EDT

## 2024-06-23 NOTE — PROGRESS NOTES
4 Eyes Skin Assessment     NAME:  Aleksey Dc  YOB: 1970  MEDICAL RECORD NUMBER:  [id number]    The patient is being assessed for  Transfer to New Unit    I agree that at least one RN has performed a thorough Head to Toe Skin Assessment on the patient. ALL assessment sites listed below have been assessed.      Areas assessed by both nurses:    Head, Face, Ears, Shoulders, Back, Chest, Arms, Elbows, Hands, Sacrum. Buttock, Coccyx, Ischium, and Legs. Feet and Heels        Does the Patient have a Wound? Yes wound(s) were present on assessment. LDA wound assessment was Initiated and completed by RN       Hao Prevention initiated by RN: Yes  Wound Care Orders initiated by RN: Yes    Pressure Injury (Stage 3,4, Unstageable, DTI, NWPT, and Complex wounds) if present, place Wound referral order by RN under : No    New Ostomies, if present place, Ostomy referral order under : No     Nurse 1 eSignature: Electronically signed by Radha Hoskins RN on 6/23/24 at 6:49 PM EDT    **SHARE this note so that the co-signing nurse can place an eSignature**    Nurse 2 eSignature: Electronically signed by Marysol Chavez RN on 6/23/24 at 6:53 PM EDT

## 2024-06-24 LAB
ABO + RH BLD: NORMAL
ANION GAP SERPL CALC-SCNC: 11 MMOL/L (ref 9–18)
BASOPHILS # BLD: 0 K/UL (ref 0–0.2)
BASOPHILS NFR BLD: 0 % (ref 0–2)
BLD PROD TYP BPU: NORMAL
BLOOD BANK BLOOD PRODUCT EXPIRATION DATE: NORMAL
BLOOD BANK DISPENSE STATUS: NORMAL
BLOOD BANK ISBT PRODUCT BLOOD TYPE: 5100
BLOOD BANK PRODUCT CODE: NORMAL
BLOOD BANK UNIT TYPE AND RH: NORMAL
BLOOD GROUP ANTIBODIES SERPL: NORMAL
BPU ID: NORMAL
BUN SERPL-MCNC: 24 MG/DL (ref 6–23)
CALCIUM SERPL-MCNC: 8.3 MG/DL (ref 8.8–10.2)
CHLORIDE SERPL-SCNC: 102 MMOL/L (ref 98–107)
CO2 SERPL-SCNC: 26 MMOL/L (ref 20–28)
CREAT SERPL-MCNC: 8.51 MG/DL (ref 0.8–1.3)
CROSSMATCH RESULT: NORMAL
DIFFERENTIAL METHOD BLD: ABNORMAL
EOSINOPHIL # BLD: 0.7 K/UL (ref 0–0.8)
EOSINOPHIL NFR BLD: 9 % (ref 0.5–7.8)
ERYTHROCYTE [DISTWIDTH] IN BLOOD BY AUTOMATED COUNT: 15.7 % (ref 11.9–14.6)
GLUCOSE SERPL-MCNC: 115 MG/DL (ref 70–99)
HCT VFR BLD AUTO: 23 % (ref 41.1–50.3)
HGB BLD-MCNC: 7.3 G/DL (ref 13.6–17.2)
IMM GRANULOCYTES # BLD AUTO: 0 K/UL (ref 0–0.5)
IMM GRANULOCYTES NFR BLD AUTO: 0 % (ref 0–5)
LYMPHOCYTES # BLD: 1.5 K/UL (ref 0.5–4.6)
LYMPHOCYTES NFR BLD: 21 % (ref 13–44)
MCH RBC QN AUTO: 30.2 PG (ref 26.1–32.9)
MCHC RBC AUTO-ENTMCNC: 31.7 G/DL (ref 31.4–35)
MCV RBC AUTO: 95 FL (ref 82–102)
MONOCYTES # BLD: 0.8 K/UL (ref 0.1–1.3)
MONOCYTES NFR BLD: 11 % (ref 4–12)
NEUTS SEG # BLD: 4.3 K/UL (ref 1.7–8.2)
NEUTS SEG NFR BLD: 58 % (ref 43–78)
NRBC # BLD: 0 K/UL (ref 0–0.2)
PHOSPHATE SERPL-MCNC: 3.3 MG/DL (ref 2.5–4.5)
PLATELET # BLD AUTO: 111 K/UL (ref 150–450)
PMV BLD AUTO: 11.5 FL (ref 9.4–12.3)
POTASSIUM SERPL-SCNC: 4.1 MMOL/L (ref 3.5–5.1)
RBC # BLD AUTO: 2.42 M/UL (ref 4.23–5.6)
SODIUM SERPL-SCNC: 139 MMOL/L (ref 136–145)
SPECIMEN EXP DATE BLD: NORMAL
UNIT DIVISION: 0
UNIT ISSUE DATE/TIME: NORMAL
WBC # BLD AUTO: 7.3 K/UL (ref 4.3–11.1)

## 2024-06-24 PROCEDURE — 6370000000 HC RX 637 (ALT 250 FOR IP): Performed by: INTERNAL MEDICINE

## 2024-06-24 PROCEDURE — 6370000000 HC RX 637 (ALT 250 FOR IP): Performed by: NURSE PRACTITIONER

## 2024-06-24 PROCEDURE — 1100000000 HC RM PRIVATE

## 2024-06-24 PROCEDURE — 85025 COMPLETE CBC W/AUTO DIFF WBC: CPT

## 2024-06-24 PROCEDURE — 97165 OT EVAL LOW COMPLEX 30 MIN: CPT

## 2024-06-24 PROCEDURE — 80048 BASIC METABOLIC PNL TOTAL CA: CPT

## 2024-06-24 PROCEDURE — 90935 HEMODIALYSIS ONE EVALUATION: CPT

## 2024-06-24 PROCEDURE — 97535 SELF CARE MNGMENT TRAINING: CPT

## 2024-06-24 PROCEDURE — 2580000003 HC RX 258: Performed by: INTERNAL MEDICINE

## 2024-06-24 PROCEDURE — 6360000002 HC RX W HCPCS: Performed by: INTERNAL MEDICINE

## 2024-06-24 PROCEDURE — 84100 ASSAY OF PHOSPHORUS: CPT

## 2024-06-24 PROCEDURE — 97110 THERAPEUTIC EXERCISES: CPT

## 2024-06-24 PROCEDURE — 97161 PT EVAL LOW COMPLEX 20 MIN: CPT

## 2024-06-24 RX ORDER — CLONIDINE HYDROCHLORIDE 0.1 MG/1
0.1 TABLET ORAL 3 TIMES DAILY
Status: DISCONTINUED | OUTPATIENT
Start: 2024-06-24 | End: 2024-06-24

## 2024-06-24 RX ORDER — CLONIDINE HYDROCHLORIDE 0.1 MG/1
0.1 TABLET ORAL 2 TIMES DAILY
Status: DISCONTINUED | OUTPATIENT
Start: 2024-06-24 | End: 2024-06-25

## 2024-06-24 RX ADMIN — OXYCODONE 5 MG: 5 TABLET ORAL at 07:54

## 2024-06-24 RX ADMIN — OXYCODONE 5 MG: 5 TABLET ORAL at 03:44

## 2024-06-24 RX ADMIN — ALLOPURINOL 100 MG: 100 TABLET ORAL at 12:05

## 2024-06-24 RX ADMIN — SODIUM CHLORIDE 1 MG: 9 INJECTION INTRAMUSCULAR; INTRAVENOUS; SUBCUTANEOUS at 16:42

## 2024-06-24 RX ADMIN — CARVEDILOL 25 MG: 25 TABLET, FILM COATED ORAL at 12:05

## 2024-06-24 RX ADMIN — ATORVASTATIN CALCIUM 10 MG: 20 TABLET, FILM COATED ORAL at 12:05

## 2024-06-24 RX ADMIN — SODIUM CHLORIDE 1 MG: 9 INJECTION INTRAMUSCULAR; INTRAVENOUS; SUBCUTANEOUS at 19:45

## 2024-06-24 RX ADMIN — SODIUM CHLORIDE 1 MG: 9 INJECTION INTRAMUSCULAR; INTRAVENOUS; SUBCUTANEOUS at 06:41

## 2024-06-24 RX ADMIN — SODIUM CHLORIDE, PRESERVATIVE FREE 10 ML: 5 INJECTION INTRAVENOUS at 12:12

## 2024-06-24 RX ADMIN — CLONIDINE HYDROCHLORIDE 0.1 MG: 0.1 TABLET ORAL at 12:05

## 2024-06-24 RX ADMIN — CLONIDINE HYDROCHLORIDE 0.1 MG: 0.1 TABLET ORAL at 21:36

## 2024-06-24 RX ADMIN — OXYCODONE 5 MG: 5 TABLET ORAL at 22:05

## 2024-06-24 RX ADMIN — CARVEDILOL 25 MG: 25 TABLET, FILM COATED ORAL at 16:42

## 2024-06-24 RX ADMIN — SEVELAMER CARBONATE 4000 MG: 800 TABLET, FILM COATED ORAL at 16:38

## 2024-06-24 RX ADMIN — ESCITALOPRAM OXALATE 20 MG: 10 TABLET ORAL at 12:05

## 2024-06-24 RX ADMIN — OXYCODONE 5 MG: 5 TABLET ORAL at 17:59

## 2024-06-24 RX ADMIN — SODIUM CHLORIDE, PRESERVATIVE FREE 10 ML: 5 INJECTION INTRAVENOUS at 21:38

## 2024-06-24 RX ADMIN — LACTULOSE 20 G: 10 SOLUTION ORAL at 16:43

## 2024-06-24 RX ADMIN — AMLODIPINE BESYLATE 10 MG: 10 TABLET ORAL at 12:05

## 2024-06-24 RX ADMIN — OXYCODONE 5 MG: 5 TABLET ORAL at 12:21

## 2024-06-24 RX ADMIN — ZOLPIDEM TARTRATE 5 MG: 5 TABLET, COATED ORAL at 21:26

## 2024-06-24 RX ADMIN — SODIUM CHLORIDE 1 MG: 9 INJECTION INTRAMUSCULAR; INTRAVENOUS; SUBCUTANEOUS at 03:10

## 2024-06-24 ASSESSMENT — PAIN DESCRIPTION - LOCATION
LOCATION: HAND
LOCATION: ARM
LOCATION: LEG;ARM
LOCATION: ARM;HAND
LOCATION: ARM
LOCATION: ARM;LEG
LOCATION: ARM
LOCATION: HAND;ARM

## 2024-06-24 ASSESSMENT — PAIN SCALES - GENERAL
PAINLEVEL_OUTOF10: 8
PAINLEVEL_OUTOF10: 0
PAINLEVEL_OUTOF10: 6
PAINLEVEL_OUTOF10: 6
PAINLEVEL_OUTOF10: 0
PAINLEVEL_OUTOF10: 5
PAINLEVEL_OUTOF10: 0
PAINLEVEL_OUTOF10: 6
PAINLEVEL_OUTOF10: 6
PAINLEVEL_OUTOF10: 8
PAINLEVEL_OUTOF10: 2

## 2024-06-24 ASSESSMENT — PAIN DESCRIPTION - DESCRIPTORS
DESCRIPTORS: BURNING;DISCOMFORT
DESCRIPTORS: BURNING;DISCOMFORT
DESCRIPTORS: ACHING;BURNING
DESCRIPTORS: STABBING;THROBBING

## 2024-06-24 ASSESSMENT — PAIN DESCRIPTION - ORIENTATION
ORIENTATION: RIGHT
ORIENTATION: LEFT
ORIENTATION: LOWER
ORIENTATION: RIGHT
ORIENTATION: RIGHT

## 2024-06-24 ASSESSMENT — PAIN SCALES - WONG BAKER
WONGBAKER_NUMERICALRESPONSE: NO HURT
WONGBAKER_NUMERICALRESPONSE: NO HURT
WONGBAKER_NUMERICALRESPONSE: HURTS EVEN MORE

## 2024-06-24 ASSESSMENT — PAIN DESCRIPTION - PAIN TYPE: TYPE: ACUTE PAIN

## 2024-06-24 NOTE — PROGRESS NOTES
Hospitalist Progress Note   Admit Date:  2024 10:00 AM   Name:  Aleksey Dc   Age:  53 y.o.  Sex:  male  :  1970   MRN:  [id number]   Room:  0/    Presenting/Chief Complaint: No chief complaint on file.     Reason(s) for Admission: Volume overload [E87.70]     Hospital Course:   Please refer to the admission H&P for details of presentation.    In summary, Aleksey Dc is a 53 y.o. male with medical history significant for recent right upper extremity necrotizing fasciitis treated at [facility], ESRD on HD, hypertension, CAD status post stents, CHF, severe claustrophobia/anxiety who presented to emergency room with shortness of breath.  Patient had missed dialysis on Friday as he was sent from his dialysis center ER but then was discharged from ER without dialysis. In the ER, he was hypoxic to 69% on room air.  Refused BiPAP or facemask due to severe anxiety/claustrophobia.  He was noted to be using accessory muscle respiration and tripoding.  Patient was placed on Airvo and was admitted to ICU on Precedex gtt and Cardene gtt. for his hypertensive emergency   He underwent dialysis on 2024 with improvement in his respiration.  Patient was transferred to the floor    Subjective/24 hr Events (24) :  Seen and examined at bedside this morning.  No acute events overnight.  Is currently on nasal cannula and denies any complaints.  Patient was having dialysis today.  He endorses improvement in his symptoms and denies any complaints.    Assessment & Plan:   Acute respiratory failure with hypoxia due to fluid overload  Fluid overload likely due to missed dialysis  24: Respiratory status still not at baseline  -On nasal cannula, continue weaning as tolerated  -Nephrology consulted.  Dialysis as per nephrology.    Hypertensive emergency  Blood pressure better control  Status post Cardene drip  -Resume home amlodipine, Coreg  -Started on Catapres 0.1 mg twice daily    Acute on chronic  cloNIDine (CATAPRES) tablet 0.1 mg  0.1 mg Oral BID    0.9 % sodium chloride infusion   IntraVENous PRN    midazolam PF (VERSED) injection 2 mg  2 mg IntraVENous Once    sodium chloride flush 0.9 % injection 5-40 mL  5-40 mL IntraVENous 2 times per day    sodium chloride flush 0.9 % injection 5-40 mL  5-40 mL IntraVENous PRN    0.9 % sodium chloride infusion   IntraVENous PRN    ondansetron (ZOFRAN-ODT) disintegrating tablet 4 mg  4 mg Oral Q8H PRN    Or    ondansetron (ZOFRAN) injection 4 mg  4 mg IntraVENous Q6H PRN    polyethylene glycol (GLYCOLAX) packet 17 g  17 g Oral Daily PRN    acetaminophen (TYLENOL) tablet 650 mg  650 mg Oral Q6H PRN    Or    acetaminophen (TYLENOL) suppository 650 mg  650 mg Rectal Q6H PRN    LORazepam (ATIVAN) 1 mg in sodium chloride (PF) 0.9 % 10 mL injection  1 mg IntraVENous Q2H PRN    carvedilol (COREG) tablet 25 mg  25 mg Oral BID WC    escitalopram (LEXAPRO) tablet 20 mg  20 mg Oral Daily    nitroGLYCERIN (NITROSTAT) SL tablet 0.4 mg  0.4 mg SubLINGual Q5 Min PRN    zolpidem (AMBIEN) tablet 5 mg  5 mg Oral Nightly PRN    oxyCODONE (ROXICODONE) immediate release tablet 5 mg  5 mg Oral Q4H PRN    lactulose (CHRONULAC) 10 GM/15ML solution 20 g  20 g Oral BID PRN    atorvastatin (LIPITOR) tablet 10 mg  10 mg Oral Daily    amLODIPine (NORVASC) tablet 10 mg  10 mg Oral Daily    allopurinol (ZYLOPRIM) tablet 100 mg  100 mg Oral Daily    sevelamer (RENVELA) tablet 4,000 mg  4,000 mg Oral TID        Signed:  Luis Phillips MD    Part of this note may have been written by using a voice dictation software.  The note has been proof read but may still contain some grammatical/other typographical errors.

## 2024-06-24 NOTE — PROGRESS NOTES
Physical Therapy Note:    Attempted to see patient this AM for physical therapy evaluation session. Patient off the floor for HD. Will follow and re-attempt as schedule permits/patient available. Thank you,    Roma Miranda, PT     Rehab Caseload Tracker

## 2024-06-24 NOTE — CARE COORDINATION
Pt chart reviewed for discharge planning. MSW met with pt at bedside, verified demographic information/ health insurance. Pt lives with souse in one level home, states independent with ADLs, uses no DME, and drives . PCP was confirmed, last seen in the office last week.. Pt reports [facility] in the come for PT/OT and Wound RN. MSW will follow pt plan of care and assist with supportive care referrals pending pt clinical progress.  Please consult case management if specific needs arise.          06/24/24 1419   Service Assessment   Patient Orientation Alert and Oriented;Person;Place;Situation;Self   Cognition Alert   History Provided By Patient   Primary Caregiver Self   Support Systems Spouse/Significant Other   Patient's Healthcare Decision Maker is: Legal Next of Kin   PCP Verified by CM Yes   Last Visit to PCP Within last 3 months   Prior Functional Level Independent in ADLs/IADLs   Current Functional Level Independent in ADLs/IADLs   Can patient return to prior living arrangement Yes   Ability to make needs known: Good   Family able to assist with home care needs: Yes   Would you like for me to discuss the discharge plan with any other family members/significant others, and if so, who? No   Financial Resources Medicare  ([employer])   Community Resources None   Social/Functional History   Lives With Spouse   Type of Home House   Home Layout One level   Home Access Level entry   Bathroom Shower/Tub Tub/Shower unit   Bathroom Toilet Standard   Bathroom Equipment Commode   Bathroom Accessibility Accessible   Home Equipment None   Receives Help From Family   ADL Assistance Independent   Ambulation Assistance Independent   Active  Yes   Occupation On disability   Discharge Planning   Type of Residence House   Living Arrangements Spouse/Significant Other   Current Services Prior To Admission Home Care  ([facility])   Potential Assistance Needed N/A   DME Ordered? No   Potential Assistance Purchasing Medications No

## 2024-06-24 NOTE — THERAPY EVALUATION
ACUTE OCCUPATIONAL THERAPY GOALS:   (Developed with and agreed upon by patient and/or caregiver.)  1. Patient will complete lower body bathing and dressing with INDEPENDENCE and adaptive equipment as needed.     2. Patient will complete toilet transfers and toileting with INDEPENDENCE.  3. Patient will complete grooming ADL tasks at standing level with INDEPENDENCE.  4. Patient will tolerate 30 minutes of OT treatment with 1-2 rest breaks to increase activity tolerance for ADLs.   5. Patient will complete functional transfers with INDEPENDENCE and adaptive equipment as needed.     Timeframe: 7 visits        OCCUPATIONAL THERAPY Initial Assessment, Daily Note, and AM       OT Visit Days: 1  Acknowledge Orders  Time  OT Charge Capture  Rehab Caseload Tracker      Aleksey Dc is a 53 y.o. male   PRIMARY DIAGNOSIS: Volume overload  Volume overload [E87.70]       Reason for Referral: Generalized Muscle Weakness (M62.81)  Other lack of cordination (R27.8)  Difficulty in walking, Not elsewhere classified (R26.2)  Other abnormalities of gait and mobility (R26.89)  Unspecified Lack of Coordination (R27.9)  Inpatient: Payor: [employer] MEDICARE / Plan: HUMANA CHOICE-O MEDICARE / Product Type: *No Product type* /     ASSESSMENT:     REHAB RECOMMENDATIONS:   Recommendation to date pending progress:  Setting:  Follow up with [facility] (per patient report, he is being followed by [facility])    Equipment:    To Be Determined     ASSESSMENT:  Mr. Dc is a pleasant 53 y.o. male who presents to the hospital with worsening SOB. Admitted with volume overload and hypoxic respiratory failure. PMHx of R necrotizing fascitis and end-stage renal disease on hemodialysis.    Today, pt is received supine in bed with R UE dressings in place and wife at bedside, agreeable to participate in the session. Per patient report, he lives with his wife in a single-level home with level entry. He is independent with all ADLs/IADLs  [] [] [] [] [] [] [] []    Scooting [] [] [x] [] [] [] [] [] [] [] []    Sit to Supine [] [] [] [] [] [] [] [] [] [x] []    Transfers    Sit to Stand [] [] [x] [] [] [] [] [] [] [] []    Bed to Chair [] [] [x] [] [] [] [] [] [] [] []    Stand to Sit [] [] [x] [] [] [] [] [] [] [] []    Tub/Shower [] [] [] [] [] [] [] [] [] [x] []     Toilet [] [] [] [] [] [] [] [] [] [x] []      [] [] [] [] [] [] [] [] [] [] []    I=Independent, Mod I=Modified Independent, S=Supervision/Setup, SBA=Standby Assistance, CGA=Contact Guard Assistance, Min=Minimal Assistance, Mod=Moderate Assistance, Max=Maximal Assistance, Total=Total Assistance, NT=Not Tested    ACTIVITIES OF DAILY LIVING: I Mod I S SBA CGA Min Mod Max Total NT Comments   BASIC ADLs:              Upper Body Bathing  [] [] [] [] [] [] [] [] [] [x]     Lower Body Bathing [] [] [] [] [] [] [] [] [] [x]     Toileting [] [] [] [] [] [] [] [] [] [x]    Upper Body Dressing [] [] [] [] [] [x] [] [] [] [] Sitting edge of bed, don gown as robe   Lower Body Dressing [] [] [] [] [] [] [] [] [] [x]    Feeding [] [] [] [] [] [] [] [] [] [x]    Grooming [] [] [x] [x] [] [] [] [] [] [] Standing edge of sink, oral hygiene routine   Personal Device Care [] [] [] [] [] [] [] [] [] [x]    Functional Mobility [] [] [x] [] [] [] [] [] [] [] No AD   I=Independent, Mod I=Modified Independent, S=Supervision/Setup, SBA=Standby Assistance, CGA=Contact Guard Assistance, Min=Minimal Assistance, Mod=Moderate Assistance, Max=Maximal Assistance, Total=Total Assistance, NT=Not Tested    PLAN:   FREQUENCY/DURATION   OT Plan of Care: 2 times/week for duration of hospital stay or until stated goals are met, whichever comes first.    PROBLEM LIST:   (Skilled intervention is medically necessary to address:)  Decreased ADL/Functional Activities  Decreased Activity Tolerance  Decreased AROM/PROM  Decreased Coordination  Decreased Safety Awareness  Decreased Strength  Decreased Transfer Abilities  Increased

## 2024-06-24 NOTE — DIALYSIS
TRANSFER IN - DIALYSIS    Received patient in dialysis unit from [facility] (unit) for ordered procedure.    Consent verified for renal replacement therapy. Procedure explained to patient, opportunity for Q&A provided. Call light given.     Patient a/ox3 and vital signs stable.  /94 , P75 ,  4L  O2  via NC.    Hemodialysis initiated using LUE AVF and 15 g needles.  Machine settings per MD order.    No heparin    Will monitor during treatment.

## 2024-06-24 NOTE — PROGRESS NOTES
ACUTE PHYSICAL THERAPY GOALS:   (Developed with and agreed upon by patient and/or caregiver.)  ALL GOALS MET: CLEAR FOR DISCHARGE  LTG:  (1.)Mr. Dc will move from supine to sit and sit to supine , scoot up and down, and roll side to side in bed with INDEPENDENT within 7 treatment day(s).    (2.)Mr. Dc will transfer from bed to chair and chair to bed with INDEPENDENT using the least restrictive device within 7 treatment day(s).    (3.)Mr. Dc will ambulate with INDEPENDENT for 1000 feet with the least restrictive device within 7 treatment day(s).  (4.)Mr. Dc will tolerate at least 8 min of dynamic standing activity to assist standing ADLs with the least restrictive device within 7 treatment days.      ________________________________________________________________________________________________      PHYSICAL THERAPY Initial Assessment, Daily Note, Discharge, and PM  (Link to Caseload Tracking: PT Visit Days : 1  Acknowledge Orders  Time In/Out  PT Charge Capture  Rehab Caseload Tracker     Aleksey Dc is a 53 y.o. male   PRIMARY DIAGNOSIS: Volume overload  Volume overload [E87.70]       Reason for Referral: Generalized Muscle Weakness (M62.81)  Other lack of cordination (R27.8)  Difficulty in walking, Not elsewhere classified (R26.2)  Other abnormalities of gait and mobility (R26.89)  Inpatient: Payor: HU[employer]NA MEDICARE / Plan: HUMANA GOLD PLUS HMO / Product Type: *No Product type* /     ASSESSMENT:     REHAB RECOMMENDATIONS:   Recommendation to date pending progress:  Setting:  No further skilled physical therapy after discharge from hospital    Equipment:    None     ASSESSMENT:  Mr. Dc presents in supine w/ R arm wrapped. He moves with independence, including bed mobility, transfers, and ambulation of 1000'. He requires 1L during ambulation to maintain oxygen saturation. At end of session, he is up in the chair with all needs within reach, OT about to enter room for session. He presents as functioning  at his baseline and is clear for discharge at this time.      [facility] AM-PAC™ “6 Clicks” Basic Mobility Inpatient Short Form  AM-PAC Basic Mobility - Inpatient   How much help is needed turning from your back to your side while in a flat bed without using bedrails?: None  How much help is needed moving from lying on your back to sitting on the side of a flat bed without using bedrails?: None  How much help is needed moving to and from a bed to a chair?: None  How much help is needed standing up from a chair using your arms?: None  How much help is needed walking in hospital room?: None  How much help is needed climbing 3-5 steps with a railing?: None  AM-PAC Inpatient Mobility Raw Score : 24  AM-PAC Inpatient T-Scale Score : 61.14  Mobility Inpatient CMS 0-100% Score: 0  Mobility Inpatient CMS G-Code Modifier : CH    SUBJECTIVE:   Mr. Dc states, \"It was very nice to meet you both and get some cheer\"     Social/Functional Lives With: Spouse  Type of Home: House  Home Layout: One level  Home Access: Level entry  Bathroom Shower/Tub: Tub/Shower unit  Bathroom Toilet: Standard  Bathroom Equipment: Commode  Bathroom Accessibility: Accessible  Home Equipment: None  Receives Help From: Family  ADL Assistance: Independent  Ambulation Assistance: Independent  Active : Yes  Occupation: On disability    OBJECTIVE:     PAIN: VITALS / O2: PRECAUTION / LINES / DRAINS:   Pre Treatment: 0         Post Treatment: 0 Vitals        Oxygen      IV    RESTRICTIONS/PRECAUTIONS:                    GROSS EVALUATION:  Intact Impaired (Comments):   AROM []  Decreased R wrist, finger, and hand mobility   PROM [x]    Strength []  Decreased R  strength   Balance [x]     Posture [] Forward Head   Sensation [x]     Coordination [x]      Tone [x]     Edema [x]    Activity Tolerance [x]      []      COGNITION/  PERCEPTION: Intact Impaired (Comments):   Orientation [x]     Vision [x]     Hearing [x]     Cognition  [x]

## 2024-06-24 NOTE — NURSE NAVIGATOR
Brief introduction with the patient.CHF handbook/educational material left with him. I asked him to call me for teaching or questions. He states that he would like to follow with [facility] as an outpatient.

## 2024-06-24 NOTE — DIALYSIS
TRANSFER OUT - DIALYSIS    Hemodialysis treatment completed, pt ran 3.5 hours without complications.     Patient alert and /80 , P 75       4 Kg removed.      Needles x2 removed from access and manual pressure held until hemostasis complete and pressure dressing applied.      Meds given: 0.    RBCs given during dialysis: 0    Patient to 810 after dialysis.
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
## 2024-06-24 NOTE — PROGRESS NOTES
Aleksey Dc  Admission Date: 6/22/2024         [facility] Progress Note: 6/24/2024    Follow-up for: ESRD    The patient's chart is reviewed and the patient is discussed with the staff.    Subjective:   Pt seen and examined on HD, LUE  Qb, UF 4500, SOB improving remains on 4L O2 NC  /99, HR 80    ROS:  Gen - no fever, no chills  CV - no chest pain  Lung -  shortness of breath- improving, no cough  Abd - no tenderness, no nausea/vomiting, no diarrhea  Ext - no edema    Current Facility-Administered Medications   Medication Dose Route Frequency    0.9 % sodium chloride infusion   IntraVENous PRN    midazolam PF (VERSED) injection 2 mg  2 mg IntraVENous Once    sodium chloride flush 0.9 % injection 5-40 mL  5-40 mL IntraVENous 2 times per day    sodium chloride flush 0.9 % injection 5-40 mL  5-40 mL IntraVENous PRN    0.9 % sodium chloride infusion   IntraVENous PRN    ondansetron (ZOFRAN-ODT) disintegrating tablet 4 mg  4 mg Oral Q8H PRN    Or    ondansetron (ZOFRAN) injection 4 mg  4 mg IntraVENous Q6H PRN    polyethylene glycol (GLYCOLAX) packet 17 g  17 g Oral Daily PRN    acetaminophen (TYLENOL) tablet 650 mg  650 mg Oral Q6H PRN    Or    acetaminophen (TYLENOL) suppository 650 mg  650 mg Rectal Q6H PRN    LORazepam (ATIVAN) 1 mg in sodium chloride (PF) 0.9 % 10 mL injection  1 mg IntraVENous Q2H PRN    carvedilol (COREG) tablet 25 mg  25 mg Oral BID WC    escitalopram (LEXAPRO) tablet 20 mg  20 mg Oral Daily    nitroGLYCERIN (NITROSTAT) SL tablet 0.4 mg  0.4 mg SubLINGual Q5 Min PRN    zolpidem (AMBIEN) tablet 5 mg  5 mg Oral Nightly PRN    oxyCODONE (ROXICODONE) immediate release tablet 5 mg  5 mg Oral Q4H PRN    lactulose (CHRONULAC) 10 GM/15ML solution 20 g  20 g Oral BID PRN    atorvastatin (LIPITOR) tablet 10 mg  10 mg Oral Daily    amLODIPine (NORVASC) tablet 10 mg  10 mg Oral Daily    allopurinol (ZYLOPRIM) tablet 100 mg  100 mg Oral Daily    sevelamer (RENVELA)

## 2024-06-25 VITALS
OXYGEN SATURATION: 100 % | HEIGHT: 70 IN | TEMPERATURE: 97.3 F | DIASTOLIC BLOOD PRESSURE: 92 MMHG | SYSTOLIC BLOOD PRESSURE: 158 MMHG | HEART RATE: 74 BPM | BODY MASS INDEX: 27.06 KG/M2 | WEIGHT: 189 LBS | RESPIRATION RATE: 18 BRPM

## 2024-06-25 LAB
ANION GAP SERPL CALC-SCNC: 11 MMOL/L (ref 9–18)
BASOPHILS # BLD: 0 K/UL (ref 0–0.2)
BASOPHILS NFR BLD: 0 % (ref 0–2)
BUN SERPL-MCNC: 15 MG/DL (ref 6–23)
CALCIUM SERPL-MCNC: 8.5 MG/DL (ref 8.8–10.2)
CHLORIDE SERPL-SCNC: 102 MMOL/L (ref 98–107)
CO2 SERPL-SCNC: 26 MMOL/L (ref 20–28)
CREAT SERPL-MCNC: 6.01 MG/DL (ref 0.8–1.3)
DIFFERENTIAL METHOD BLD: ABNORMAL
EOSINOPHIL # BLD: 0.6 K/UL (ref 0–0.8)
EOSINOPHIL NFR BLD: 10 % (ref 0.5–7.8)
ERYTHROCYTE [DISTWIDTH] IN BLOOD BY AUTOMATED COUNT: 15.4 % (ref 11.9–14.6)
GLUCOSE SERPL-MCNC: 88 MG/DL (ref 70–99)
HCT VFR BLD AUTO: 22.6 % (ref 41.1–50.3)
HGB BLD-MCNC: 7.4 G/DL (ref 13.6–17.2)
IMM GRANULOCYTES # BLD AUTO: 0 K/UL (ref 0–0.5)
IMM GRANULOCYTES NFR BLD AUTO: 1 % (ref 0–5)
LYMPHOCYTES # BLD: 1.4 K/UL (ref 0.5–4.6)
LYMPHOCYTES NFR BLD: 22 % (ref 13–44)
MCH RBC QN AUTO: 30.8 PG (ref 26.1–32.9)
MCHC RBC AUTO-ENTMCNC: 32.7 G/DL (ref 31.4–35)
MCV RBC AUTO: 94.2 FL (ref 82–102)
MONOCYTES # BLD: 0.8 K/UL (ref 0.1–1.3)
MONOCYTES NFR BLD: 12 % (ref 4–12)
NEUTS SEG # BLD: 3.5 K/UL (ref 1.7–8.2)
NEUTS SEG NFR BLD: 55 % (ref 43–78)
NRBC # BLD: 0.02 K/UL (ref 0–0.2)
PLATELET # BLD AUTO: 103 K/UL (ref 150–450)
PMV BLD AUTO: 12.1 FL (ref 9.4–12.3)
POTASSIUM SERPL-SCNC: 4.2 MMOL/L (ref 3.5–5.1)
RBC # BLD AUTO: 2.4 M/UL (ref 4.23–5.6)
SODIUM SERPL-SCNC: 139 MMOL/L (ref 136–145)
WBC # BLD AUTO: 6.3 K/UL (ref 4.3–11.1)

## 2024-06-25 PROCEDURE — 6360000002 HC RX W HCPCS: Performed by: INTERNAL MEDICINE

## 2024-06-25 PROCEDURE — 6360000002 HC RX W HCPCS: Performed by: NURSE PRACTITIONER

## 2024-06-25 PROCEDURE — 80048 BASIC METABOLIC PNL TOTAL CA: CPT

## 2024-06-25 PROCEDURE — 6370000000 HC RX 637 (ALT 250 FOR IP): Performed by: STUDENT IN AN ORGANIZED HEALTH CARE EDUCATION/TRAINING PROGRAM

## 2024-06-25 PROCEDURE — 36415 COLL VENOUS BLD VENIPUNCTURE: CPT

## 2024-06-25 PROCEDURE — 6370000000 HC RX 637 (ALT 250 FOR IP): Performed by: INTERNAL MEDICINE

## 2024-06-25 PROCEDURE — 2580000003 HC RX 258: Performed by: INTERNAL MEDICINE

## 2024-06-25 PROCEDURE — 85025 COMPLETE CBC W/AUTO DIFF WBC: CPT

## 2024-06-25 RX ORDER — CLONIDINE HYDROCHLORIDE 0.2 MG/1
0.2 TABLET ORAL 2 TIMES DAILY
Qty: 60 TABLET | Refills: 3 | Status: SHIPPED | OUTPATIENT
Start: 2024-06-25

## 2024-06-25 RX ORDER — AMLODIPINE BESYLATE 10 MG/1
10 TABLET ORAL DAILY
Qty: 90 TABLET | Refills: 0 | Status: SHIPPED | OUTPATIENT
Start: 2024-06-25 | End: 2024-09-23

## 2024-06-25 RX ORDER — CLONIDINE HYDROCHLORIDE 0.1 MG/1
0.2 TABLET ORAL 2 TIMES DAILY
Status: DISCONTINUED | OUTPATIENT
Start: 2024-06-25 | End: 2024-06-25 | Stop reason: HOSPADM

## 2024-06-25 RX ADMIN — EPOETIN ALFA-EPBX 10000 UNITS: 10000 INJECTION, SOLUTION INTRAVENOUS; SUBCUTANEOUS at 11:49

## 2024-06-25 RX ADMIN — ACETAMINOPHEN 650 MG: 325 TABLET ORAL at 00:22

## 2024-06-25 RX ADMIN — SODIUM CHLORIDE, PRESERVATIVE FREE 10 ML: 5 INJECTION INTRAVENOUS at 08:44

## 2024-06-25 RX ADMIN — CLONIDINE HYDROCHLORIDE 0.2 MG: 0.1 TABLET ORAL at 08:36

## 2024-06-25 RX ADMIN — ESCITALOPRAM OXALATE 20 MG: 10 TABLET ORAL at 08:36

## 2024-06-25 RX ADMIN — OXYCODONE 5 MG: 5 TABLET ORAL at 03:54

## 2024-06-25 RX ADMIN — OXYCODONE 5 MG: 5 TABLET ORAL at 07:39

## 2024-06-25 RX ADMIN — OXYCODONE 5 MG: 5 TABLET ORAL at 12:17

## 2024-06-25 RX ADMIN — SODIUM CHLORIDE 1 MG: 9 INJECTION INTRAMUSCULAR; INTRAVENOUS; SUBCUTANEOUS at 03:55

## 2024-06-25 RX ADMIN — SODIUM CHLORIDE 1 MG: 9 INJECTION INTRAMUSCULAR; INTRAVENOUS; SUBCUTANEOUS at 08:54

## 2024-06-25 RX ADMIN — CARVEDILOL 25 MG: 25 TABLET, FILM COATED ORAL at 07:39

## 2024-06-25 RX ADMIN — ATORVASTATIN CALCIUM 10 MG: 20 TABLET, FILM COATED ORAL at 08:36

## 2024-06-25 RX ADMIN — AMLODIPINE BESYLATE 10 MG: 10 TABLET ORAL at 08:36

## 2024-06-25 RX ADMIN — ALLOPURINOL 100 MG: 100 TABLET ORAL at 08:36

## 2024-06-25 RX ADMIN — SEVELAMER CARBONATE 4000 MG: 800 TABLET, FILM COATED ORAL at 07:39

## 2024-06-25 ASSESSMENT — PAIN DESCRIPTION - LOCATION
LOCATION: ARM
LOCATION: HAND

## 2024-06-25 ASSESSMENT — PAIN DESCRIPTION - ORIENTATION
ORIENTATION: RIGHT
ORIENTATION: RIGHT

## 2024-06-25 ASSESSMENT — PAIN SCALES - GENERAL
PAINLEVEL_OUTOF10: 6
PAINLEVEL_OUTOF10: 0
PAINLEVEL_OUTOF10: 0
PAINLEVEL_OUTOF10: 7
PAINLEVEL_OUTOF10: 5
PAINLEVEL_OUTOF10: 0
PAINLEVEL_OUTOF10: 8

## 2024-06-25 NOTE — DISCHARGE INSTRUCTIONS
DISCHARGE SUMMARY from Nurse    PATIENT INSTRUCTIONS:    After general anesthesia or intravenous sedation, for 24 hours or while taking prescription Narcotics:  Limit your activities  Do not drive and operate hazardous machinery  Do not make important personal or business decisions  Do  not drink alcoholic beverages  If you have not urinated within 8 hours after discharge, please contact your surgeon on call.    Report the following to your surgeon:  Excessive pain, swelling, redness or odor of or around the surgical area  Temperature over 100.5  Nausea and vomiting lasting longer than 4 hours or if unable to take medications  Any signs of decreased circulation or nerve impairment to extremity: change in color, persistent  numbness, tingling, coldness or increase pain  Any questions    What to do at Home:  Recommended activity: activity as tolerated,      If you experience any of the following symptoms temp greater than 100.4 not responsive to medication, pain not relieved by rest, new or worsening shortness of breath,  please follow up with MD.    *  Please give a list of your current medications to your Primary Care Provider.    *  Please update this list whenever your medications are discontinued, doses are      changed, or new medications (including over-the-counter products) are added.    *  Please carry medication information at all times in case of emergency situations.    These are general instructions for a healthy lifestyle:    No smoking/ No tobacco products/ Avoid exposure to second hand smoke  Surgeon General's Warning:  Quitting smoking now greatly reduces serious risk to your health.    Obesity, smoking, and sedentary lifestyle greatly increases your risk for illness    A healthy diet, regular physical exercise & weight monitoring are important for maintaining a healthy lifestyle    You may be retaining fluid if you have a history of heart failure or if you experience any of the following symptoms:

## 2024-06-25 NOTE — PROGRESS NOTES
Patient and spouse provided with written and verbal discharge instructions including new medications, medication schedule, and follow up appointments. Heart failure education performed. Patient and spouse verbalize understanding of instructions. Patient transported via wheelchair to [facility] by staff.

## 2024-06-25 NOTE — PROGRESS NOTES
Patient is in bed, axox4, pain noted in r hand/arm, no distress noted, pt reporting intermittent anxiety, respirations even and unlabored on RA, no needs stated, call light is within reach.

## 2024-06-25 NOTE — PROGRESS NOTES
Physician Progress Note      PATIENT:               YAO GILLIS  CSN #:                  [id number]  :                       1970  ADMIT DATE:       2024 10:00 AM  DISCH DATE:  RESPONDING  PROVIDER #:        Luis Hurtado MD          QUERY TEXT:    Patient admitted with volume overload and noted to have elevated HR, RR, WBC &   Procal. If possible, please document in progress notes and discharge summary   if you are evaluating and/or treating any of the following:    The medical record reflects the following:  Risk Factors: ESRD, admitted for \"Fluid overload likely due to missed   dialysis\"  Clinical Indicators: WBC 16.1, LA 3.8, Procal, 0.75, HR , RR 29, 30, 35,   27  Acute respiratory failure with hypoxia  Treatment: Pulmonology & Nephrology consult, HD, [facility]    Thank you,  Marcia Andrade RN, BSN, CDI  [email or web address]  .  Options provided:  -- SIRS of non-infectious origin due to volume overload without acute organ   dysfunction  -- SIRS of non-infectious origin due to volume overload with acute respiratory   failure with hypoxia  -- Other - I will add my own diagnosis  -- Disagree - Not applicable / Not valid  -- Disagree - Clinically unable to determine / Unknown  -- Refer to Clinical Documentation Reviewer    PROVIDER RESPONSE TEXT:    This patient has SIRS of non-infectious origin due to volume overload without   acute organ dysfunction.    Query created by: Marcia Andrade on 2024 12:08 PM      Electronically signed by:  Luis Hurtado MD 2024 1:01 PM

## 2024-06-25 NOTE — PROGRESS NOTES
Aleksey Dc  Admission Date: 6/22/2024         [facility] Progress Note: 6/25/2024    Follow-up for: ESRD    The patient's chart is reviewed and the patient is discussed with the staff.    Subjective:   Pt seen and examined in room SOB much better, right hand wound discomfort,     ROS:  Gen - no fever, no chills  CV - no chest pain  Lung -  shortness of breath- better, no cough  Abd - no tenderness, no nausea/vomiting, no diarrhea  Ext - no edema    Current Facility-Administered Medications   Medication Dose Route Frequency    cloNIDine (CATAPRES) tablet 0.2 mg  0.2 mg Oral BID    0.9 % sodium chloride infusion   IntraVENous PRN    midazolam PF (VERSED) injection 2 mg  2 mg IntraVENous Once    sodium chloride flush 0.9 % injection 5-40 mL  5-40 mL IntraVENous 2 times per day    sodium chloride flush 0.9 % injection 5-40 mL  5-40 mL IntraVENous PRN    0.9 % sodium chloride infusion   IntraVENous PRN    ondansetron (ZOFRAN-ODT) disintegrating tablet 4 mg  4 mg Oral Q8H PRN    Or    ondansetron (ZOFRAN) injection 4 mg  4 mg IntraVENous Q6H PRN    polyethylene glycol (GLYCOLAX) packet 17 g  17 g Oral Daily PRN    acetaminophen (TYLENOL) tablet 650 mg  650 mg Oral Q6H PRN    Or    acetaminophen (TYLENOL) suppository 650 mg  650 mg Rectal Q6H PRN    LORazepam (ATIVAN) 1 mg in sodium chloride (PF) 0.9 % 10 mL injection  1 mg IntraVENous Q2H PRN    carvedilol (COREG) tablet 25 mg  25 mg Oral BID WC    escitalopram (LEXAPRO) tablet 20 mg  20 mg Oral Daily    nitroGLYCERIN (NITROSTAT) SL tablet 0.4 mg  0.4 mg SubLINGual Q5 Min PRN    zolpidem (AMBIEN) tablet 5 mg  5 mg Oral Nightly PRN    oxyCODONE (ROXICODONE) immediate release tablet 5 mg  5 mg Oral Q4H PRN    lactulose (CHRONULAC) 10 GM/15ML solution 20 g  20 g Oral BID PRN    atorvastatin (LIPITOR) tablet 10 mg  10 mg Oral Daily    amLODIPine (NORVASC) tablet 10 mg  10 mg Oral Daily    allopurinol (ZYLOPRIM) tablet 100 mg  100 mg Oral Daily        Landry Bird, APRN - CNP  [facility]

## 2024-06-25 NOTE — PROGRESS NOTES
Patient ambulating in room, a&ox4 on room air. Reports pain 8/10 in R arm, denies SOB and nausea. Patient only stated need at this time is pain medication. Instructed patient to call with any arising needs. Call light and tray table are within reach.

## 2024-06-25 NOTE — CARE COORDINATION
Pt is for discharge home today with [facility] ([facility]) for RN wound care.  Referral called/faxed to [facility] for follow up home care as ordered.  No additional CM orders received or supportive care needs expressed at this time.       06/25/24 1110   Service Assessment   Patient's Healthcare Decision Maker is: Legal Next of Kin   Social/Functional History   Lives With Spouse   Type of Home House   Home Layout One level   Home Access Level entry   Bathroom Shower/Tub Tub/Shower unit   Bathroom Toilet Standard   Bathroom Equipment Commode   Bathroom Accessibility Accessible   Home Equipment None   Receives Help From Family   ADL Assistance Independent   Ambulation Assistance Independent   Active  Yes   Occupation On disability   Services At/After Discharge   Transition of Care Consult (CM Consult) Discharge Planning   Services At/After Discharge None   [facility] Resource Information Provided? No   Mode of Transport at Discharge Other (see comment)  (Spouse)   Confirm Follow Up Transport Family   Condition of Participation: Discharge Planning   The Plan for Transition of Care is related to the following treatment goals: Pt will return home at discharge.   The Patient and/Or Patient Representative agree with the Discharge Plan? Yes   Freedom of Choice list was provided with basic dialogue that supports the patient's individualized plan of care/goals, treatment preferences, and shares the quality data associated with the providers?  Yes

## 2024-06-25 NOTE — DISCHARGE SUMMARY
interval not displayed.      LFT Recent Labs     06/23/24  0451   BILITOT 0.4   ALKPHOS 74   AST 15   ALT <5*   GLOB 3.2      Cardiac  No results found for: \"NTPROBNP\", \"TROPHS\"   Coags No results found for: \"PROTIME\", \"INR\", \"APTT\"   A1c No results found for: \"LABA1C\", \"EAG\"   Lipids No results found for: \"CHOL\", \"HDL\", \"CHOLHDLRATIO\", \"TRIG\"   Thyroid  No results found for: \"TSHELE\", \"XFM1ABH\"     Most Recent UA No results found for: \"COLORU\", \"APPEARANCE\", \"SPECGRAV\", \"LABPH\", \"PROTEINU\", \"GLUCOSEU\", \"KETUA\", \"BILIRUBINUR\", \"BLOODU\", \"UROBILINOGEN\", \"NITRU\", \"LEUKOCYTESUR\", \"WBCUA\", \"RBCUA\", \"BACTERIA\", \"LABCAST\", \"MUCUS\"     Microbiology:  Results       Procedure Component Value Units Date/Time    Culture, Blood 1 [[id number]] Collected: 06/22/24 1622    Order Status: Completed Specimen: Blood Updated: 06/25/24 0617     Special Requests --        RIGHT  ARM       Culture NO GROWTH 3 DAYS       Culture, Blood 1 [[id number]] Collected: 06/22/24 1622    Order Status: Completed Specimen: Blood Updated: 06/25/24 0617     Special Requests --        RIGHT  FOREARM       Culture NO GROWTH 3 DAYS       Culture, Blood 1 [[id number]] Collected: 06/22/24 1131    Order Status: Completed Specimen: Blood Updated: 06/25/24 0617     Special Requests --        LEFT  ARM       Culture NO GROWTH 3 DAYS       Culture, Blood 1 [[id number]] Collected: 06/22/24 1113    Order Status: Completed Specimen: Blood Updated: 06/25/24 0617     Special Requests --        NO SPECIAL REQUESTS  LEFT  ARM       Culture NO GROWTH 3 DAYS               All Labs from Last 24 Hrs:  Recent Results (from the past 24 hour(s))   CBC with Auto Differential    Collection Time: 06/25/24  4:54 AM   Result Value Ref Range    WBC 6.3 4.3 - 11.1 K/uL    RBC 2.40 (L) 4.23 - 5.6 M/uL    Hemoglobin 7.4 (L) 13.6 - 17.2 g/dL    Hematocrit 22.6 (L) 41.1 - 50.3 %    MCV 94.2 82.0 - 102.0 FL    MCH 30.8 26.1 - 32.9 PG    MCHC 32.7 31.4 - 35.0 g/dL    RDW 15.4 (H) 11.9 -    06/24/24 1205 -- 80 -- (!) 158/85 --   06/24/24 1117 98.6 °F (37 °C) 81 18 (!) 189/94 100 %       Oxygen Therapy  SpO2: 100 %  Pulse Oximetry Type: Continuous  Pulse via Oximetry: 70 beats per minute  Pulse Oximeter Device Mode: Continuous  O2 Device: None (Room air)  Skin Assessment: Clean, dry, & intact  FiO2 : 45 %  O2 Flow Rate (L/min): 3 L/min    Estimated body mass index is 27.12 kg/m² as calculated from the following:    Height as of this encounter: 1.778 m (5' 10\").    Weight as of this encounter: 85.7 kg (189 lb).    Intake/Output Summary (Last 24 hours) at 6/25/2024 1100  Last data filed at 6/25/2024 0815  Gross per 24 hour   Intake 490 ml   Output --   Net 490 ml         Physical Exam:    General:    Well nourished.  No overt distress  Head:  Normocephalic, atraumatic  Eyes:  Sclerae appear normal.  Pupils equally round.    HENT:  Nares appear normal, no drainage.  Moist mucous membranes  Neck:  No restricted ROM.  Trachea midline  CV:   RRR.  No m/r/g.  No JVD  Lungs:   CTAB.  No wheezing, rhonchi, or rales.  Respirations even, unlabored  Abdomen:   Soft, nontender, nondistended.    Extremities: Warm and dry.   No edema.  Right hand wrapped in clean dry and intact bandage  Skin:     No rashes.  Normal coloration  Neuro:  CN II-XII grossly intact.  Psych:  Normal mood and affect.      Signed:  Luis Phillips MD    Part of this note may have been written by using a voice dictation software.  The note has been proof read but may still contain some grammatical/other typographical errors.

## 2024-06-27 LAB
BACTERIA SPEC CULT: NORMAL
SERVICE CMNT-IMP: NORMAL

## 2024-07-25 ENCOUNTER — OFFICE VISIT (OUTPATIENT)
Age: 54
End: 2024-07-25
Payer: MEDICARE

## 2024-07-25 VITALS
SYSTOLIC BLOOD PRESSURE: 160 MMHG | HEART RATE: 68 BPM | BODY MASS INDEX: 23.92 KG/M2 | HEIGHT: 70 IN | WEIGHT: 167.11 LBS | DIASTOLIC BLOOD PRESSURE: 80 MMHG

## 2024-07-25 DIAGNOSIS — Z09 HOSPITAL DISCHARGE FOLLOW-UP: ICD-10-CM

## 2024-07-25 DIAGNOSIS — I15.0 RENOVASCULAR HYPERTENSION: Primary | Chronic | ICD-10-CM

## 2024-07-25 DIAGNOSIS — N18.6 ESRD (END STAGE RENAL DISEASE) (HCC): Chronic | ICD-10-CM

## 2024-07-25 DIAGNOSIS — I25.10 CORONARY ARTERY DISEASE INVOLVING NATIVE CORONARY ARTERY OF NATIVE HEART WITHOUT ANGINA PECTORIS: Chronic | ICD-10-CM

## 2024-07-25 PROCEDURE — 3079F DIAST BP 80-89 MM HG: CPT | Performed by: INTERNAL MEDICINE

## 2024-07-25 PROCEDURE — 1111F DSCHRG MED/CURRENT MED MERGE: CPT | Performed by: INTERNAL MEDICINE

## 2024-07-25 PROCEDURE — G8427 DOCREV CUR MEDS BY ELIG CLIN: HCPCS | Performed by: INTERNAL MEDICINE

## 2024-07-25 PROCEDURE — G8420 CALC BMI NORM PARAMETERS: HCPCS | Performed by: INTERNAL MEDICINE

## 2024-07-25 PROCEDURE — 99204 OFFICE O/P NEW MOD 45 MIN: CPT | Performed by: INTERNAL MEDICINE

## 2024-07-25 PROCEDURE — 4004F PT TOBACCO SCREEN RCVD TLK: CPT | Performed by: INTERNAL MEDICINE

## 2024-07-25 PROCEDURE — 3017F COLORECTAL CA SCREEN DOC REV: CPT | Performed by: INTERNAL MEDICINE

## 2024-07-25 PROCEDURE — 3077F SYST BP >= 140 MM HG: CPT | Performed by: INTERNAL MEDICINE

## 2024-07-25 RX ORDER — ASPIRIN 81 MG/1
81 TABLET ORAL DAILY
Qty: 90 TABLET | Refills: 0
Start: 2024-07-25

## 2024-07-25 RX ORDER — LOSARTAN POTASSIUM 50 MG/1
50 TABLET ORAL DAILY
Qty: 30 TABLET | Refills: 11 | Status: SHIPPED | OUTPATIENT
Start: 2024-07-25

## 2024-07-25 ASSESSMENT — ENCOUNTER SYMPTOMS: SHORTNESS OF BREATH: 0

## 2024-07-25 NOTE — PROGRESS NOTES
[facility], [address] [place] [address]  PHONE: [phone]    SUBJECTIVE:   Aleksey Dc is a 53 y.o. male 1970   seen for a consultation visit regarding the following:     Chief Complaint   Patient presents with    Follow-Up from Hospital    New Patient              Consultation is requested for evaluation of Follow-Up from Hospital and New Patient   .    History of present illness: 53 y.o. male with PMH CAD s/p PCI RCA, ESRD on HD MWF, HTN, chronic HFpEF presenting for hospital follow up. Patient admitted 6/22/24-6/25/24 for acute hypoxic respiratory failure and hypertensive emergency after missing HD. He has been feeling well overall since discharge. They lowered his dry weight at HD since then. BP at home routinely up to 190s systolic. No other acute complaints.      Past Medical History, Past Surgical History, Family history, Social History, and Medications were all reviewed with the patient today and updated as necessary.       Allergies   Allergen Reactions    Heparin Other (See Comments)     Heparin induced cytopenia per pt     Past Medical History:   Diagnosis Date    CHF (congestive heart failure) (HCC)     Chronic kidney disease     Hypertension     Necrotic hand wound (HCC)      History reviewed. No pertinent surgical history.  History reviewed. No pertinent family history.  Social History     Tobacco Use    Smoking status: Every Day     Types: Cigars     Passive exposure: Never    Smokeless tobacco: Never   Substance Use Topics    Alcohol use: Not on file       ROS:    Review of Systems   Cardiovascular:  Positive for palpitations. Negative for chest pain, dyspnea on exertion and syncope.   Respiratory:  Negative for shortness of breath.    Neurological:  Negative for light-headedness.          PHYSICAL EXAM:   BP (!) 160/80   Pulse 68   Ht 1.778 m (5' 10\")   Wt 75.8 kg (167 lb 1.7 oz)   BMI 23.98 kg/m²      Physical Exam  Constitutional:       General: He is not in acute

## 2024-08-22 ENCOUNTER — OFFICE VISIT (OUTPATIENT)
Age: 54
End: 2024-08-22
Payer: MEDICARE

## 2024-08-22 VITALS
WEIGHT: 165.4 LBS | HEIGHT: 70 IN | DIASTOLIC BLOOD PRESSURE: 74 MMHG | HEART RATE: 78 BPM | SYSTOLIC BLOOD PRESSURE: 130 MMHG | BODY MASS INDEX: 23.68 KG/M2

## 2024-08-22 DIAGNOSIS — I15.0 RENOVASCULAR HYPERTENSION: Primary | Chronic | ICD-10-CM

## 2024-08-22 DIAGNOSIS — K59.00 CONSTIPATION, UNSPECIFIED CONSTIPATION TYPE: ICD-10-CM

## 2024-08-22 DIAGNOSIS — I25.10 CORONARY ARTERY DISEASE INVOLVING NATIVE CORONARY ARTERY OF NATIVE HEART WITHOUT ANGINA PECTORIS: Chronic | ICD-10-CM

## 2024-08-22 PROCEDURE — 3017F COLORECTAL CA SCREEN DOC REV: CPT | Performed by: INTERNAL MEDICINE

## 2024-08-22 PROCEDURE — G8420 CALC BMI NORM PARAMETERS: HCPCS | Performed by: INTERNAL MEDICINE

## 2024-08-22 PROCEDURE — 99214 OFFICE O/P EST MOD 30 MIN: CPT | Performed by: INTERNAL MEDICINE

## 2024-08-22 PROCEDURE — G8427 DOCREV CUR MEDS BY ELIG CLIN: HCPCS | Performed by: INTERNAL MEDICINE

## 2024-08-22 PROCEDURE — 4004F PT TOBACCO SCREEN RCVD TLK: CPT | Performed by: INTERNAL MEDICINE

## 2024-08-22 PROCEDURE — 3078F DIAST BP <80 MM HG: CPT | Performed by: INTERNAL MEDICINE

## 2024-08-22 PROCEDURE — 3075F SYST BP GE 130 - 139MM HG: CPT | Performed by: INTERNAL MEDICINE

## 2024-08-22 RX ORDER — LOSARTAN POTASSIUM 50 MG/1
50 TABLET ORAL 2 TIMES DAILY
Qty: 60 TABLET | Refills: 11 | Status: SHIPPED | OUTPATIENT
Start: 2024-08-22

## 2024-08-22 RX ORDER — ATORVASTATIN CALCIUM 40 MG/1
40 TABLET, FILM COATED ORAL DAILY
Qty: 30 TABLET | Refills: 11 | Status: SHIPPED | OUTPATIENT
Start: 2024-08-22

## 2024-08-22 RX ORDER — HYDRALAZINE HYDROCHLORIDE 25 MG/1
25 TABLET, FILM COATED ORAL 3 TIMES DAILY
Qty: 90 TABLET | Refills: 11 | Status: SHIPPED | OUTPATIENT
Start: 2024-08-22

## 2024-08-22 ASSESSMENT — ENCOUNTER SYMPTOMS: SHORTNESS OF BREATH: 1

## 2024-08-22 NOTE — PROGRESS NOTES
[facility] CARDIOLOGY  [address] [place] [address], [address]  [place] [address]  PHONE: [phone]      24    NAME:  Aleksey Dc  : 1970  MRN: [id number]         SUBJECTIVE:   Aleksey Dc is a 54 y.o. male seen for a follow up visit regarding the following:     Chief Complaint   Patient presents with    Coronary Artery Disease            HPI:  Follow up  Coronary Artery Disease   .      54 y.o. male with PMH CAD s/p PCI RCA, ESRD on HD MWF, HTN, chronic HFpEF presenting for hospital follow up. Patient reports BP has still been elevated since his last visit. He has been has high as the 200s systolic. It is more likely to be elevated after his HD session. He reports an episode of a panic attack that happened after having constipation recently. He reports feeling hot, having chest pain and feeling short of breath. No other acute complaints.        Cardiac Medications       Calcium Channel Blockers       amLODIPine (NORVASC) 10 MG tablet Take 1 tablet by mouth daily       Antiadrenergic Antihypertensives       cloNIDine (CATAPRES) 0.2 MG tablet Take 1 tablet by mouth 2 times daily       Vasodilators       hydrALAZINE (APRESOLINE) 25 MG tablet Take 1 tablet by mouth 3 times daily       HMG CoA Reductase Inhibitors       atorvastatin (LIPITOR) 40 MG tablet Take 1 tablet by mouth daily       Alpha-Beta Blockers       carvedilol (COREG) 25 MG tablet Take 1 tablet by mouth 2 times daily (with meals)       Angiotensin II Receptor Antagonists       losartan (COZAAR) 50 MG tablet Take 1 tablet by mouth in the morning and at bedtime       Salicylates       aspirin 81 MG EC tablet Take 1 tablet by mouth daily                  Past Medical History, Past Surgical History, Family history, Social History, and Medications were all reviewed with the patient today and updated as necessary.     Prior to Admission medications    Medication Sig Start Date End Date Taking? Authorizing Provider   losartan (COZAAR) 50 MG

## 2024-09-04 NOTE — TELEPHONE ENCOUNTER
Requested Prescriptions     Pending Prescriptions Disp Refills    cloNIDine (CATAPRES) 0.2 MG tablet 60 tablet 3     Sig: Take 1 tablet by mouth 2 times daily

## 2024-09-05 RX ORDER — CLONIDINE HYDROCHLORIDE 0.2 MG/1
0.2 TABLET ORAL 2 TIMES DAILY
Qty: 60 TABLET | Refills: 3 | Status: SHIPPED | OUTPATIENT
Start: 2024-09-05

## 2024-10-08 DIAGNOSIS — I25.10 CORONARY ARTERY DISEASE INVOLVING NATIVE CORONARY ARTERY OF NATIVE HEART WITHOUT ANGINA PECTORIS: Chronic | ICD-10-CM

## 2024-10-08 RX ORDER — CLONIDINE HYDROCHLORIDE 0.2 MG/1
0.2 TABLET ORAL 2 TIMES DAILY
Qty: 180 TABLET | Refills: 3 | Status: SHIPPED | OUTPATIENT
Start: 2024-10-08

## 2024-10-29 ENCOUNTER — OFFICE VISIT (OUTPATIENT)
Age: 54
End: 2024-10-29
Payer: MEDICARE

## 2024-10-29 VITALS
WEIGHT: 164.2 LBS | HEART RATE: 90 BPM | RESPIRATION RATE: 17 BRPM | BODY MASS INDEX: 23.51 KG/M2 | OXYGEN SATURATION: 100 % | HEIGHT: 70 IN | DIASTOLIC BLOOD PRESSURE: 83 MMHG | SYSTOLIC BLOOD PRESSURE: 126 MMHG

## 2024-10-29 DIAGNOSIS — R45.89 ANXIETY ABOUT HEALTH: ICD-10-CM

## 2024-10-29 DIAGNOSIS — K59.09 CHRONIC CONSTIPATION: Primary | ICD-10-CM

## 2024-10-29 PROCEDURE — 99203 OFFICE O/P NEW LOW 30 MIN: CPT | Performed by: PHYSICIAN ASSISTANT

## 2024-10-29 PROCEDURE — 3017F COLORECTAL CA SCREEN DOC REV: CPT | Performed by: PHYSICIAN ASSISTANT

## 2024-10-29 PROCEDURE — 3074F SYST BP LT 130 MM HG: CPT | Performed by: PHYSICIAN ASSISTANT

## 2024-10-29 PROCEDURE — G8427 DOCREV CUR MEDS BY ELIG CLIN: HCPCS | Performed by: PHYSICIAN ASSISTANT

## 2024-10-29 PROCEDURE — G8420 CALC BMI NORM PARAMETERS: HCPCS | Performed by: PHYSICIAN ASSISTANT

## 2024-10-29 PROCEDURE — 3079F DIAST BP 80-89 MM HG: CPT | Performed by: PHYSICIAN ASSISTANT

## 2024-10-29 PROCEDURE — G8484 FLU IMMUNIZE NO ADMIN: HCPCS | Performed by: PHYSICIAN ASSISTANT

## 2024-10-29 PROCEDURE — 4004F PT TOBACCO SCREEN RCVD TLK: CPT | Performed by: PHYSICIAN ASSISTANT

## 2024-10-29 NOTE — PROGRESS NOTES
Pain to any Dr. Del Valle's history Aleksey Dc (:  1970) is a 54 y.o. male new patient referred to our office for evaluation of the following chief complaint(s):  New Patient (Constipation)        Assessment & Plan   ASSESSMENT/PLAN:  1. Chronic constipation  2. Anxiety about health    -Referred for constipation which is relatively well-managed currently with lactulose every other day.  Counseled on basic constipation measures and constipation handout provided.  Surveillance colonoscopy in  was normal; should be due for repeat colonoscopy in 2025.   -Also noted to have chronic anemia which he has been told is related to ESRD, previously worked up by GI including EGD, colonoscopy, and capsule endoscopy. He denies signs/symptoms of overt GIB. Could consider FOBT if anemia worsens/fails to improve or any concern for GI source of blood loss and/or sooner endoscopy.   -Has a lot of anxiety about health - he is working to establish with psychiatry though had an issue with previous referral.  -Also asks about swelling in the right face. ?infection versus lymph node that should be evaluated by PCP and may need referral for drainage versus biopsy. Area is not tender, fluctuant, or draining but he attempted to drain a few days ago with a heat-sterilized needle.  -Plan to follow-up 6 months or sooner PRN if any new or worsening concerns.    Subjective   SUBJECTIVE/OBJECTIVE  Aleksey Dc is a 54 y.o. year old male referred to our office for evaluation of constipation. Referral note reviewed from 2024.  Labs reviewed and notable for anemia with Hgb as low as 6.5 in .  Most recent Hgb 2024 was 7.4.  Also noted to have thrombocytopenia with platelet count 103K.  Prior pertinent GI evaluation includes:    -EGD 2018: Normal; recommend repeat capsule endoscopy  -EGD/EUS 2020: Small umbilicated nodule in the antrum suspicious for possible small pancreatic ectopic rest versus inflamed area,

## 2024-10-29 NOTE — PATIENT INSTRUCTIONS
For constipation:  Recommend Miralax 1 cap 1-2 x daily and stool softener (ie Colace 100 mg) twice daily. You can add milk of magnesia or magnesium citrate as needed for additional relief.  Add Dulcolax or glycerin suppository if no bowel movement after 2-3 days.    Increase daily fiber intake to 25-35 grams daily either by dietary intake or an over-the-counter psyllium husk fiber supplement. Limit alcohol and fatty food intake. Drink at least 80 oz water daily or more as needed to maintain adequate hydration. Exercise regularly and consider weight loss if appropriate based on your current weight. Avoid straining or lingering on the toilet longer than necessary. Try scheduled toilet time approximately 30 minutes after your first drink or meal of the day. Elevate your feet when toileting to bring your knees in line with your hips using a small stool or Squatty Potty. Review your medication list and discuss with your PCP whether any of these medications may exacerbate constipation.